# Patient Record
Sex: MALE | Race: BLACK OR AFRICAN AMERICAN | Employment: UNEMPLOYED | URBAN - METROPOLITAN AREA
[De-identification: names, ages, dates, MRNs, and addresses within clinical notes are randomized per-mention and may not be internally consistent; named-entity substitution may affect disease eponyms.]

---

## 2017-10-02 ENCOUNTER — TELEPHONE (OUTPATIENT)
Dept: INTERNAL MEDICINE | Facility: CLINIC | Age: 37
End: 2017-10-02

## 2018-01-02 ENCOUNTER — TELEPHONE (OUTPATIENT)
Dept: FAMILY MEDICINE | Facility: CLINIC | Age: 38
End: 2018-01-02

## 2018-01-02 NOTE — TELEPHONE ENCOUNTER
Reason for Call:  Form, our goal is to have forms completed with 72 hours, however, some forms may require a visit or additional information.    Type of letter, form or note:  Unemployment form    Who is the form from?: Patient    Where did the form come from: Patient or family brought in       What clinic location was the form placed at?: [facility])    Where the form was placed: 's Box    What number is listed as a contact on the form?: [phone]       Additional comments: Please call patient when the form is ready to be picked up at the .    Call taken on 1/2/2018 at 12:24 PM by Yaneli Quiroz

## 2018-01-02 NOTE — TELEPHONE ENCOUNTER
Date forms received: 01/02/2017  Form completed as much as possible by Ashley Woodward.  Forms placed: in providers folder Date placed: 01/02/2018  Ashley Woodward

## 2018-01-03 NOTE — TELEPHONE ENCOUNTER
Last seen by me in 09/2015. Unable to complete forms w/o seeing pt.     Leyla Chatman MD.   Family Physician.  [facility].

## 2018-01-05 ENCOUNTER — OFFICE VISIT (OUTPATIENT)
Dept: FAMILY MEDICINE | Facility: CLINIC | Age: 38
End: 2018-01-05
Payer: COMMERCIAL

## 2018-01-05 VITALS
DIASTOLIC BLOOD PRESSURE: 79 MMHG | BODY MASS INDEX: 24.17 KG/M2 | HEIGHT: 67 IN | WEIGHT: 154 LBS | SYSTOLIC BLOOD PRESSURE: 131 MMHG | TEMPERATURE: 97.7 F | HEART RATE: 79 BPM

## 2018-01-05 DIAGNOSIS — M25.561 CHRONIC PAIN OF RIGHT KNEE: Primary | ICD-10-CM

## 2018-01-05 DIAGNOSIS — G89.29 CHRONIC PAIN OF RIGHT KNEE: Primary | ICD-10-CM

## 2018-01-05 PROCEDURE — 99214 OFFICE O/P EST MOD 30 MIN: CPT | Performed by: FAMILY MEDICINE

## 2018-01-05 RX ORDER — IBUPROFEN 600 MG/1
600 TABLET, FILM COATED ORAL EVERY 6 HOURS PRN
Qty: 90 TABLET | Refills: 0 | Status: SHIPPED | OUTPATIENT
Start: 2018-01-05

## 2018-01-05 NOTE — NURSING NOTE
"Chief Complaint   Patient presents with     Forms     unemployment papers        Initial /79  Pulse 79  Temp 97.7  F (36.5  C) (Oral)  Ht 5' 6.5\" (1.689 m)  Wt 154 lb (69.9 kg)  BMI 24.48 kg/m2 Estimated body mass index is 24.48 kg/(m^2) as calculated from the following:    Height as of this encounter: 5' 6.5\" (1.689 m).    Weight as of this encounter: 154 lb (69.9 kg).  Medication Reconciliation: complete  Rakel Leonardo MA    "

## 2018-01-05 NOTE — MR AVS SNAPSHOT
After Visit Summary   1/5/2018    Ysabel Diggs    MRN: [id number]           Patient Information     Date Of Birth          1980        Visit Information        Provider Department      1/5/2018 7:40 AM Leyla Chatman MD [facility]        Today's Diagnoses     Chronic pain of right knee    -  1       Follow-ups after your visit        Additional Services     [facility] PT, HAND, AND CHIROPRACTIC REFERRAL       **This order will print in the [facility] Scheduling Office**    Physical Therapy, Hand Therapy and Chiropractic Care are available through:    *[facility]  *[facility]  *[facility]    Call one number to schedule at any of the above locations: [phone].    Your provider has referred you to: Physical Therapy at [facility] or [facility]    Indication/Reason for Referral: right knee pain.   Onset of Illness: 2 yrs ago.   Therapy Orders: Evaluate and Treat  Special Programs:   Special Request:     Lexie Owen      Additional Comments for the Therapist or Chiropractor:     Please be aware that coverage of these services is subject to the terms and limitations of your health insurance plan.  Call member services at your health plan with any benefit or coverage questions.      Please bring the following to your appointment:    *Your personal calendar for scheduling future appointments  *Comfortable clothing                  Future tests that were ordered for you today     Open Future Orders        Priority Expected Expires Ordered    MR Knee Right w/o Contrast Routine  1/5/2019 1/5/2018            Who to contact     If you have questions or need follow up information about today's clinic visit or your schedule please contact [facility] directly at [phone].  Normal or non-critical lab and imaging results will be communicated to you by MyChart, letter or phone within 4 business days after the clinic  "has received the results. If you do not hear from us within 7 days, please contact the clinic through [employer] or phone. If you have a critical or abnormal lab result, we will notify you by phone as soon as possible.  Submit refill requests through [employer] or call your pharmacy and they will forward the refill request to us. Please allow 3 business days for your refill to be completed.          Additional Information About Your Visit        [employer] Information     [employer] lets you send messages to your doctor, view your test results, renew your prescriptions, schedule appointments and more. To sign up, go to [email or web address] . Click on \"Log in\" on the left side of the screen, which will take you to the Welcome page. Then click on \"Sign up Now\" on the right side of the page.     You will be asked to enter the access code listed below, as well as some personal information. Please follow the directions to create your username and password.     Your access code is: [email or web address]  Expires: 2018  8:33 AM     Your access code will  in 90 days. If you need help or a new code, please call your [place] clinic or [phone].        Care EveryWhere ID     This is your Care EveryWhere ID. This could be used by other organizations to access your [place] medical records  [id number]        Your Vitals Were     Pulse Temperature Height BMI (Body Mass Index)          79 97.7  F (36.5  C) (Oral) 5' 6.5\" (1.689 m) 24.48 kg/m2         Blood Pressure from Last 3 Encounters:   18 131/79   09/02/15 110/69   11 108/72    Weight from Last 3 Encounters:   18 154 lb (69.9 kg)   09/02/15 145 lb (65.8 kg)   11 166 lb (75.3 kg)              We Performed the Following     [facility] PT, HAND, AND CHIROPRACTIC REFERRAL          Today's Medication Changes          These changes are accurate as of: 18  8:33 AM.  If you have any questions, ask your nurse or doctor.               Start taking these medicines.  "       Dose/Directions    ibuprofen 600 MG tablet   Commonly known as:  ADVIL/MOTRIN   Used for:  Chronic pain of right knee   Started by:  Leyla Chatman MD        Dose:  600 mg   Take 1 tablet (600 mg) by mouth every 6 hours as needed for moderate pain   Quantity:  90 tablet   Refills:  0            Where to get your medicines      These medications were sent to [facility]/pharmacy #[id number] - [place], [place] - [address] [facility] [address]     Phone:  [phone]     ibuprofen 600 MG tablet                Primary Care Provider Office Phone # Fax #    Noemí Mendoza [phone]       [address]  [facility] [address]        Equal Access to Services     [facility] : Amy ricoo Soalena, waaxda luqadaha, qaybta kaalmada adelorie, bianca salmon . So [facility] [phone].    ATENCIÓN: Si habla español, tiene a schafer disposición servicios gratuitos de asistencia lingüística. [facility] [phone].    We comply with applicable federal civil rights laws and [place] laws. We do not discriminate on the basis of race, color, national origin, age, disability, sex, sexual orientation, or gender identity.            Thank you!     Thank you for choosing [facility]  for your care. Our goal is always to provide you with excellent care. Hearing back from our patients is one way we can continue to improve our services. Please take a few minutes to complete the written survey that you may receive in the mail after your visit with us. Thank you!             Your Updated Medication List - Protect others around you: Learn how to safely use, store and throw away your medicines at www.disposemymeds.org.          This list is accurate as of: 1/5/18  8:33 AM.  Always use your most recent med list.                   Brand Name Dispense Instructions for use Diagnosis    ibuprofen 600 MG tablet     ADVIL/MOTRIN    90 tablet    Take 1 tablet (600 mg) by mouth every 6 hours as needed for moderate pain    Chronic pain of right knee

## 2018-01-05 NOTE — PROGRESS NOTES
SUBJECTIVE:   Ysabel Diggs is a 37 year old male who presents to clinic today for the following health issues:    Unemployment papers.     He has been working as  for last 19 yrs.   He works 40 hrs /week.   Job responsibilities: varies. 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   He has right knee pain and pain gets worse with walking for more than 1/2 hr at a time. As his knee pain flares up he is requesting his employer for the job that     Right knee pain x last 2 yrs. He plays soccer and then had knee sprain. Per pt he had MRI done that showed torn ligament and had some supportive treatments done. Was using knee brace. It helped with pain. He was advised to get knee surgery done however due to lack of insurance coverage he could not get the surgery done.     He is requesting form to be filled out for work restriction: not to walk for more than 1/2 hr at a time as his knee swells up, gets painful and he cannot walk.     Problem list and histories reviewed & adjusted, as indicated.  Additional history: as documented    Patient Active Problem List   Diagnosis     H. pylori infection     Smoking     CARDIOVASCULAR SCREENING; LDL GOAL LESS THAN 160     Past Surgical History:   Procedure Laterality Date     C APPENDECTOMY  2005     UPPER GI ENDOSCOPY  June 2010    Esophagitis + stomach ulcer       Social History   Substance Use Topics     Smoking status: Current Some Day Smoker     Packs/day: 0.50     Types: Cigarettes     Smokeless tobacco: Never Used      Comment: 10-15 cig a day     Alcohol use Yes      Comment: [place].     Family History   Problem Relation Age of Onset     Hypertension Mother      DIABETES Father          No current outpatient prescriptions on file.     No Known Allergies  Recent Labs   Lab Test  09/02/15   1206  03/11/10   0919   A1C  5.5   --    LDL  118   --    HDL  49   --    TRIG  222*   --    ALT   --   25   CR   --   0.90  "  GFRESTIMATED   --   >90   GFRESTBLACK   --   >90   POTASSIUM   --   3.8      BP Readings from Last 3 Encounters:   01/05/18 131/79   09/02/15 110/69   06/23/11 108/72    Wt Readings from Last 3 Encounters:   01/05/18 154 lb (69.9 kg)   09/02/15 145 lb (65.8 kg)   06/23/11 166 lb (75.3 kg)                  Labs reviewed in EPIC          Reviewed and updated as needed this visit by clinical staffTobacco  Allergies  Meds  Med Hx  Surg Hx  Fam Hx  Soc Hx      Reviewed and updated as needed this visit by Provider         ROS:  Constitutional, HEENT, cardiovascular, pulmonary, gi and gu systems are negative, except as otherwise noted.      OBJECTIVE:   /79  Pulse 79  Temp 97.7  F (36.5  C) (Oral)  Ht 5' 6.5\" (1.689 m)  Wt 154 lb (69.9 kg)  BMI 24.48 kg/m2  Body mass index is 24.48 kg/(m^2).  GENERAL: healthy, alert and no distress  Right knee : no swelling, erythema or warmth.   No specific point tenderness.   Knee flexion is full with crepitus felt with knee flexion.   Knee extension is normal.   Tests for menisci: mild discomfort   Normal right hip and ankle.     ASSESSMENT/PLAN:       ICD-10-CM    1. Chronic pain of right knee M25.561 MR Knee Right w/o Contrast    G89.29 [facility] PT, HAND, AND CHIROPRACTIC REFERRAL     ibuprofen (ADVIL/MOTRIN) 600 MG tablet     Chronic right knee pain, was seen 2 yrs ago, no records in EPIC.   As per his knee exam, high chances of improving with PT. However as surgery was advised in past and as confirmed diagnosis is needed for workability, knee MRI is ordered.   Work restriction for 3 months: meanwhile PT, Ibuprofen for pain PRN, ice pack PRN for knee swelling, wear knee brace, MRI right knee, if needed will refer to Ortho.     Total visit time 25 mins, more than 505 time was spent in face to face counseling.     Leyla Chatman MD  [facility]  "

## 2018-01-12 ENCOUNTER — OFFICE VISIT (OUTPATIENT)
Dept: FAMILY MEDICINE | Facility: CLINIC | Age: 38
End: 2018-01-12

## 2018-01-12 VITALS
SYSTOLIC BLOOD PRESSURE: 125 MMHG | HEIGHT: 67 IN | BODY MASS INDEX: 24.33 KG/M2 | HEART RATE: 84 BPM | DIASTOLIC BLOOD PRESSURE: 85 MMHG | OXYGEN SATURATION: 99 % | WEIGHT: 155 LBS | TEMPERATURE: 97.6 F

## 2018-01-12 DIAGNOSIS — Z59.89 ON ECONOMIC ASSISTANCE PROGRAM: Primary | ICD-10-CM

## 2018-01-12 SDOH — ECONOMIC STABILITY - INCOME SECURITY: OTHER PROBLEMS RELATED TO HOUSING AND ECONOMIC CIRCUMSTANCES: Z59.89

## 2018-01-12 NOTE — MR AVS SNAPSHOT
"              After Visit Summary   1/12/2018    Ysabel Diggs    MRN: [id number]           Patient Information     Date Of Birth          1980        Visit Information        Provider Department      1/12/2018 1:00 PM Nesha Mcneil MD [facility]        Today's Diagnoses     On economic assistance program    -  1       Follow-ups after your visit        Who to contact     If you have questions or need follow up information about today's clinic visit or your schedule please contact [facility] directly at [phone].  Normal or non-critical lab and imaging results will be communicated to you by [employer], letter or phone within 4 business days after the clinic has received the results. If you do not hear from us within 7 days, please contact the clinic through [employer] or phone. If you have a critical or abnormal lab result, we will notify you by phone as soon as possible.  Submit refill requests through [employer] or call your pharmacy and they will forward the refill request to us. Please allow 3 business days for your refill to be completed.          Additional Information About Your Visit        MyChart Information     [employer] gives you secure access to your electronic health record. If you see a primary care provider, you can also send messages to your care team and make appointments. If you have questions, please call your primary care clinic.  If you do not have a primary care provider, please call [phone] and they will assist you.        Care EveryWhere ID     This is your Care EveryWhere ID. This could be used by other organizations to access your [place] medical records  [id number]        Your Vitals Were     Pulse Temperature Height Pulse Oximetry BMI (Body Mass Index)       84 97.6  F (36.4  C) 5' 6.5\" (1.689 m) 99% 24.64 kg/m2        Blood Pressure from Last 3 Encounters:   01/12/18 125/85   01/05/18 131/79   09/02/15 110/69    Weight from Last 3 Encounters: "   01/12/18 155 lb (70.3 kg)   01/05/18 154 lb (69.9 kg)   09/02/15 145 lb (65.8 kg)              Today, you had the following     No orders found for display       Primary Care Provider Office Phone # Fax #    Noemí Mendoza [phone]       [address] [facility] [address]        Equal Access to Services     VALDEMAR LOVE : Hadii aad ku hadasho Soomaali, waaxda luqadaha, qaybta kaalmada adeegyada, waxay idiin hayaan adeeg kharash la'yovana . So [facility] [phone].    ATENCIÓN: Si habla sam, tiene a schafer disposición servicios gratuitos de asistencia lingüística. Llame al [phone].    We comply with applicable federal civil rights laws and [place] laws. We do not discriminate on the basis of race, color, national origin, age, disability, sex, sexual orientation, or gender identity.            Thank you!     Thank you for choosing [facility]  for your care. Our goal is always to provide you with excellent care. Hearing back from our patients is one way we can continue to improve our services. Please take a few minutes to complete the written survey that you may receive in the mail after your visit with us. Thank you!             Your Updated Medication List - Protect others around you: Learn how to safely use, store and throw away your medicines at www.disposemymeds.org.          This list is accurate as of: 1/12/18  1:53 PM.  Always use your most recent med list.                   Brand Name Dispense Instructions for use Diagnosis    ibuprofen 600 MG tablet    ADVIL/MOTRIN    90 tablet    Take 1 tablet (600 mg) by mouth every 6 hours as needed for moderate pain    Chronic pain of right knee

## 2018-01-12 NOTE — PROGRESS NOTES
SUBJECTIVE:   Ysabel Diggs is a 37 year old male who presents to clinic today for the following health issues:      Unemployment Disability paperwork    Patient presents to clinic and requests that I fill out his unemployment disability paperwork. Patient has already been to a different [place] clinic on 1/5/2018 for the same request. I politely advised the patient that I was not able to help with his unemployment disability paperwork and that he should continue the current evaluation for disability paperwork from the recent 1/5/18 clinic appointment. The patient became angry and subsequently walked out of the exam room.      There will be no charge for today's clinic visit.

## 2018-01-15 ENCOUNTER — OFFICE VISIT (OUTPATIENT)
Dept: FAMILY MEDICINE | Facility: CLINIC | Age: 38
End: 2018-01-15
Payer: COMMERCIAL

## 2018-01-15 VITALS
HEART RATE: 82 BPM | SYSTOLIC BLOOD PRESSURE: 125 MMHG | DIASTOLIC BLOOD PRESSURE: 83 MMHG | BODY MASS INDEX: 24.8 KG/M2 | TEMPERATURE: 98.5 F | WEIGHT: 156 LBS

## 2018-01-15 DIAGNOSIS — M25.561 CHRONIC PAIN OF BOTH KNEES: Primary | ICD-10-CM

## 2018-01-15 DIAGNOSIS — M25.562 CHRONIC PAIN OF BOTH KNEES: Primary | ICD-10-CM

## 2018-01-15 DIAGNOSIS — G89.29 CHRONIC PAIN OF BOTH KNEES: Primary | ICD-10-CM

## 2018-01-15 PROCEDURE — 99207 ZZC NO CHARGE LOS: CPT | Performed by: FAMILY MEDICINE

## 2018-01-15 NOTE — MR AVS SNAPSHOT
After Visit Summary   1/15/2018    Ysabel Diggs    MRN: [id number]           Patient Information     Date Of Birth          1980        Visit Information        Provider Department      1/15/2018 6:00 PM Leyla Chatman MD [facility]        Today's Diagnoses     Chronic pain of both knees    -  1       Follow-ups after your visit        Who to contact     If you have questions or need follow up information about today's clinic visit or your schedule please contact [facility] directly at [phone].  Normal or non-critical lab and imaging results will be communicated to you by spigithart, letter or phone within 4 business days after the clinic has received the results. If you do not hear from us within 7 days, please contact the clinic through [employer] or phone. If you have a critical or abnormal lab result, we will notify you by phone as soon as possible.  Submit refill requests through [employer] or call your pharmacy and they will forward the refill request to us. Please allow 3 business days for your refill to be completed.          Additional Information About Your Visit        MyChart Information     [employer] gives you secure access to your electronic health record. If you see a primary care provider, you can also send messages to your care team and make appointments. If you have questions, please call your primary care clinic.  If you do not have a primary care provider, please call [phone] and they will assist you.        Care EveryWhere ID     This is your Care EveryWhere ID. This could be used by other organizations to access your [place] medical records  [id number]        Your Vitals Were     Pulse Temperature BMI (Body Mass Index)             82 98.5  F (36.9  C) (Oral) 24.8 kg/m2          Blood Pressure from Last 3 Encounters:   01/15/18 125/83   01/12/18 125/85   01/05/18 131/79    Weight from Last 3 Encounters:   01/15/18  156 lb (70.8 kg)   01/12/18 155 lb (70.3 kg)   01/05/18 154 lb (69.9 kg)              Today, you had the following     No orders found for display       Primary Care Provider Office Phone # Fax #    Noemí Mendoza [phone]       [address] [facility] [address]        Equal Access to Services     [facility]: Hadii aad ku hadasho Soomaali, waaxda luqadaha, qaybta kaalmada adeegyada, waxay idiin hayaan adeeg kharash la'aan . So [facility] [phone].    ATENCIÓN: Si brian vargas, tiene a schafer disposición servicios gratuitos de asistencia lingüística. Llame al [phone].    We comply with applicable federal civil rights laws and [place] laws. We do not discriminate on the basis of race, color, national origin, age, disability, sex, sexual orientation, or gender identity.            Thank you!     Thank you for choosing [facility]  for your care. Our goal is always to provide you with excellent care. Hearing back from our patients is one way we can continue to improve our services. Please take a few minutes to complete the written survey that you may receive in the mail after your visit with us. Thank you!             Your Updated Medication List - Protect others around you: Learn how to safely use, store and throw away your medicines at www.disposemymeds.org.          This list is accurate as of: 1/15/18 11:59 PM.  Always use your most recent med list.                   Brand Name Dispense Instructions for use Diagnosis    ibuprofen 600 MG tablet    ADVIL/MOTRIN    90 tablet    Take 1 tablet (600 mg) by mouth every 6 hours as needed for moderate pain    Chronic pain of right knee

## 2018-01-16 NOTE — NURSING NOTE
"Chief Complaint   Patient presents with     Forms       Initial /83  Pulse 82  Temp 98.5  F (36.9  C) (Oral)  Wt 156 lb (70.8 kg)  BMI 24.8 kg/m2 Estimated body mass index is 24.8 kg/(m^2) as calculated from the following:    Height as of 1/12/18: 5' 6.5\" (1.689 m).    Weight as of this encounter: 156 lb (70.8 kg).  Medication Reconciliation: complete  Rakel Leonardo MA    "

## 2018-01-16 NOTE — PROGRESS NOTES
SUBJECTIVE:   Ysabel Diggs is a 37 year old male who presents to clinic today for the following health issues:  Unemployment form :     Pt was seen by me approx 2 weeks ago. Forms were filled out for workability.     Today he is here requesting to change the dates on his forms. Says that he could not work due to his knee pain since Oct 2017, his employer did not pay him. He wanted me to fill out the forms from Oct so that he may get paid for that time frame.   Explained that I am unable to fill out the forms and change dates from Oct 2017 as he was not seen by me.   He was told several times during last office visit that we do not have any records of his knee MRI/ knee exams etc. He was upset with that and started arguing. I gave him JUAN form to get records, offered to talk to clinic manager. He did not agree with anything and left the clinic.     Leyla Chatman MD.   Family Physician.  [facility].

## 2018-04-26 ENCOUNTER — OFFICE VISIT (OUTPATIENT)
Dept: FAMILY MEDICINE | Facility: CLINIC | Age: 38
End: 2018-04-26
Payer: MEDICAID

## 2018-04-26 VITALS
BODY MASS INDEX: 24.64 KG/M2 | DIASTOLIC BLOOD PRESSURE: 77 MMHG | SYSTOLIC BLOOD PRESSURE: 116 MMHG | WEIGHT: 155 LBS | TEMPERATURE: 97.6 F | HEART RATE: 79 BPM | OXYGEN SATURATION: 99 %

## 2018-04-26 DIAGNOSIS — Z13.220 SCREENING FOR CHOLESTEROL LEVEL: ICD-10-CM

## 2018-04-26 DIAGNOSIS — R68.82 DECREASED LIBIDO: Primary | ICD-10-CM

## 2018-04-26 DIAGNOSIS — R30.0 DYSURIA: ICD-10-CM

## 2018-04-26 DIAGNOSIS — Z83.3 FAMILY HISTORY OF DIABETES MELLITUS: ICD-10-CM

## 2018-04-26 LAB
ALBUMIN UR-MCNC: NEGATIVE MG/DL
APPEARANCE UR: CLEAR
BILIRUB UR QL STRIP: NEGATIVE
CHOLEST SERPL-MCNC: 244 MG/DL
COLOR UR AUTO: YELLOW
GLUCOSE BLD-MCNC: 79 MG/DL (ref 70–99)
GLUCOSE UR STRIP-MCNC: NEGATIVE MG/DL
HDLC SERPL-MCNC: 53 MG/DL
HGB UR QL STRIP: NEGATIVE
KETONES UR STRIP-MCNC: NEGATIVE MG/DL
LDLC SERPL CALC-MCNC: 145 MG/DL
LEUKOCYTE ESTERASE UR QL STRIP: NEGATIVE
NITRATE UR QL: NEGATIVE
NONHDLC SERPL-MCNC: 191 MG/DL
PH UR STRIP: 6.5 PH (ref 5–7)
SOURCE: NORMAL
SP GR UR STRIP: 1.01 (ref 1–1.03)
TRIGL SERPL-MCNC: 228 MG/DL
UROBILINOGEN UR STRIP-ACNC: 0.2 EU/DL (ref 0.2–1)

## 2018-04-26 PROCEDURE — 87491 CHLMYD TRACH DNA AMP PROBE: CPT | Performed by: FAMILY MEDICINE

## 2018-04-26 PROCEDURE — 81003 URINALYSIS AUTO W/O SCOPE: CPT | Performed by: FAMILY MEDICINE

## 2018-04-26 PROCEDURE — 36415 COLL VENOUS BLD VENIPUNCTURE: CPT | Performed by: FAMILY MEDICINE

## 2018-04-26 PROCEDURE — 99213 OFFICE O/P EST LOW 20 MIN: CPT | Performed by: FAMILY MEDICINE

## 2018-04-26 PROCEDURE — 87591 N.GONORRHOEAE DNA AMP PROB: CPT | Performed by: FAMILY MEDICINE

## 2018-04-26 PROCEDURE — 82947 ASSAY GLUCOSE BLOOD QUANT: CPT | Performed by: FAMILY MEDICINE

## 2018-04-26 PROCEDURE — 80061 LIPID PANEL: CPT | Performed by: FAMILY MEDICINE

## 2018-04-26 PROCEDURE — 84403 ASSAY OF TOTAL TESTOSTERONE: CPT | Performed by: FAMILY MEDICINE

## 2018-04-26 NOTE — LETTER
[facility]   [address] [place]  [address]  [phone]                                   May 1, 2018    Ysabel Diggs  [address]   [place] [address]        Dear Ysabel,    Your test for chlamydia and gonorrhea were normal   Your testosterone level is normal   Your blood sugar is normal   Your urine analysis is normal     Your cholesterols are elevated and are higher thatn the last time   They are at the level that you need to change your diet to low fat and need to increase your exercise   Follow up with fasting labs in 6 months.  If no change you will need to start on cholesterol lowering medications    Results for orders placed or performed in visit on 04/26/18   UA reflex to Microscopic and Culture   Result Value Ref Range    Color Urine Yellow     Appearance Urine Clear     Glucose Urine Negative NEG^Negative mg/dL    Bilirubin Urine Negative NEG^Negative    Ketones Urine Negative NEG^Negative mg/dL    Specific Gravity Urine 1.010 1.003 - 1.035    Blood Urine Negative NEG^Negative    pH Urine 6.5 5.0 - 7.0 pH    Protein Albumin Urine Negative NEG^Negative mg/dL    Urobilinogen Urine 0.2 0.2 - 1.0 EU/dL    Nitrite Urine Negative NEG^Negative    Leukocyte Esterase Urine Negative NEG^Negative    Source Midstream Urine    Glucose whole blood   Result Value Ref Range    Glucose Whole Blood 79 70 - 99 mg/dL   Lipid panel reflex to direct LDL Fasting   Result Value Ref Range    Cholesterol 244 (H) <200 mg/dL    Triglycerides 228 (H) <150 mg/dL    HDL Cholesterol 53 >39 mg/dL    LDL Cholesterol Calculated 145 (H) <100 mg/dL    Non HDL Cholesterol 191 (H) <130 mg/dL   Testosterone, total   Result Value Ref Range    Testosterone Total 586 240 - 950 ng/dL   Chlamydia trachomatis PCR   Result Value Ref Range    Specimen Description Urine     Chlamydia Trachomatis PCR Negative NEG^Negative   Neisseria gonorrhoeae PCR   Result Value Ref Range    Specimen Descrip  Urine     N Gonorrhea PCR Negative NEG^Negative       If you have any questions please call the clinic at [phone]    Sincerely,    Ramiro Rick MD  bmd

## 2018-04-26 NOTE — PROGRESS NOTES
SUBJECTIVE:   Ysabel Diggs is a 37 year old male who presents to clinic today for the following health issues:    Genitourinary - Male  Onset: x 1 mo    Description:   Dysuria (painful urination): YES  Hematuria (blood in urine): no   Frequency: no   Are you urinating at night : YES  Hesitancy (delay in urine): YES  Retention (unable to empty): YES  Decrease in urinary flow: YES- Needs to push out  Incontinence: no     Progression of Symptoms:  worsening    Accompanying Signs & Symptoms:  Fever: no   Back/Flank pain: YES  Urethral discharge: YES- Few times  Testicle lumps/masses/pain: No   Nausea and/or vomiting: YES  Abdominal pain: YES    History:   History of frequent UTI's: YES  History of kidney stones: no   History of hernias: no   Personal or Family history of Prostate problems: no  Sexually active: not currently    **Would like to get his chol and check for diabetes due to family History    No family history of cholesterol problems   No heart disease in men before age of 50      Father, brother and sister have diabetes       Problem list and histories reviewed & adjusted, as indicated.    Ros: no chest pain, chest tightness   No sob   No leg edema   abdominal pain that is chronic   Has had colonoscopy and endoscopy   Treated for H.Pylori a few years ago     Patient has chronic constipation   He is not using fiber supplements     Denies fever or chills   Weight stable       O: /77 (BP Location: Left arm, Patient Position: Sitting, Cuff Size: Adult Regular)  Pulse 79  Temp 97.6  F (36.4  C) (Oral)  Wt 155 lb (70.3 kg)  SpO2 99%  BMI 24.64 kg/m2    The abdomen is soft without tenderness, guarding, mass, rebound or organomegaly. Bowel sounds are normal. No CVA tenderness or inguinal adenopathy noted.    Patient has smaller testicles   They are non tender     Genitalia otherwise normal     No discharge noted     Here with his fiancee       ICD-10-CM    1. Decreased libido R68.82 Glucose whole  blood     Testosterone, total   2. Dysuria R30.0 UA reflex to Microscopic and Culture     Chlamydia trachomatis PCR     Neisseria gonorrhoeae PCR   3. Family history of diabetes mellitus Z83.3 Glucose whole blood   4. Screening for cholesterol level Z13.220 Lipid panel reflex to direct LDL Fasting

## 2018-04-26 NOTE — NURSING NOTE
"Chief Complaint   Patient presents with     UTI     Sx's     Blood Draw     Would like to get his chol and check for diabetes due to family Hx       Initial /77 (BP Location: Left arm, Patient Position: Sitting, Cuff Size: Adult Regular)  Pulse 79  Temp 97.6  F (36.4  C) (Oral)  Wt 155 lb (70.3 kg)  SpO2 99%  BMI 24.64 kg/m2 Estimated body mass index is 24.64 kg/(m^2) as calculated from the following:    Height as of 1/12/18: 5' 6.5\" (1.689 m).    Weight as of this encounter: 155 lb (70.3 kg).  Medication Reconciliation: complete   Adeola Morin MA      "

## 2018-04-26 NOTE — MR AVS SNAPSHOT
After Visit Summary   4/26/2018    Ysabel Diggs    MRN: [id number]           Patient Information     Date Of Birth          1980        Visit Information        Provider Department      4/26/2018 8:40 AM Ramiro Rick MD [facility]        Today's Diagnoses     Decreased libido    -  1    Dysuria        Family history of diabetes mellitus        Screening for cholesterol level           Follow-ups after your visit        Who to contact     If you have questions or need follow up information about today's clinic visit or your schedule please contact [facility] directly at [phone].  Normal or non-critical lab and imaging results will be communicated to you by MyChart, letter or phone within 4 business days after the clinic has received the results. If you do not hear from us within 7 days, please contact the clinic through [employer] or phone. If you have a critical or abnormal lab result, we will notify you by phone as soon as possible.  Submit refill requests through [employer] or call your pharmacy and they will forward the refill request to us. Please allow 3 business days for your refill to be completed.          Additional Information About Your Visit        MyChart Information     [employer] gives you secure access to your electronic health record. If you see a primary care provider, you can also send messages to your care team and make appointments. If you have questions, please call your primary care clinic.  If you do not have a primary care provider, please call [phone] and they will assist you.        Care EveryWhere ID     This is your Care EveryWhere ID. This could be used by other organizations to access your [facility] medical records  [id number]        Your Vitals Were     Pulse Temperature Pulse Oximetry BMI (Body Mass Index)          79 97.6  F (36.4  C) (Oral) 99% 24.64 kg/m2         Blood Pressure from Last 3 Encounters:    04/26/18 116/77   01/15/18 125/83   01/12/18 125/85    Weight from Last 3 Encounters:   04/26/18 155 lb (70.3 kg)   01/15/18 156 lb (70.8 kg)   01/12/18 155 lb (70.3 kg)              We Performed the Following     Chlamydia trachomatis PCR     Glucose whole blood     Lipid panel reflex to direct LDL Fasting     Neisseria gonorrhoeae PCR     Testosterone, total     UA reflex to Microscopic and Culture        Primary Care Provider Office Phone # Fax #    Noemí Mendoza [phone]       [address]  [facility] [address]        Equal Access to Services     [facility] : Hadii brooks ricoo Christie, waaxda luqadaha, qaybta kaalmada hardik, bianca salmon . So [facility] [phone].    ATENCIÓN: Si habla español, tiene a schafer disposición servicios gratuitos de asistencia lingüística. [facility] [phone].    We comply with applicable federal civil rights laws and [place] laws. We do not discriminate on the basis of race, color, national origin, age, disability, sex, sexual orientation, or gender identity.            Thank you!     Thank you for choosing [facility]  for your care. Our goal is always to provide you with excellent care. Hearing back from our patients is one way we can continue to improve our services. Please take a few minutes to complete the written survey that you may receive in the mail after your visit with us. Thank you!             Your Updated Medication List - Protect others around you: Learn how to safely use, store and throw away your medicines at www.disposemymeds.org.          This list is accurate as of 4/26/18  9:19 AM.  Always use your most recent med list.                   Brand Name Dispense Instructions for use Diagnosis    ibuprofen 600 MG tablet    ADVIL/MOTRIN    90 tablet    Take 1 tablet (600 mg) by mouth every 6 hours as needed for moderate pain    Chronic pain of right knee

## 2018-04-27 LAB
C TRACH DNA SPEC QL NAA+PROBE: NEGATIVE
N GONORRHOEA DNA SPEC QL NAA+PROBE: NEGATIVE
SPECIMEN SOURCE: NORMAL
SPECIMEN SOURCE: NORMAL

## 2018-04-30 LAB — TESTOST SERPL-MCNC: 586 NG/DL (ref 240–950)

## 2018-05-01 NOTE — PROGRESS NOTES
Your test for chlamydia and gonorrhea were normal   Your testosterone level is normal   Your blood sugar is normal   Your urine analysis is normal     Your cholesterols are elevated and are higher thatn the last time   They are at the level that you need to change your diet to low fat and need to increase your exercise   Follow up with fasting labs in 6 months.  If no change you will need to start on cholesterol lowering medications

## 2018-08-16 ENCOUNTER — AMBULATORY - HEALTHEAST (OUTPATIENT)
Dept: FAMILY MEDICINE | Facility: CLINIC | Age: 38
End: 2018-08-16

## 2018-08-16 ENCOUNTER — OFFICE VISIT - HEALTHEAST (OUTPATIENT)
Dept: FAMILY MEDICINE | Facility: CLINIC | Age: 38
End: 2018-08-16

## 2018-08-16 DIAGNOSIS — Q54.9 HYPOSPADIAS: ICD-10-CM

## 2018-08-16 DIAGNOSIS — N53.12 PAIN WITH EJACULATION: ICD-10-CM

## 2018-08-16 DIAGNOSIS — N48.89 PENILE PAIN: ICD-10-CM

## 2018-08-16 DIAGNOSIS — R30.0 DYSURIA: ICD-10-CM

## 2018-08-16 DIAGNOSIS — R10.9 ABDOMINAL PAIN: ICD-10-CM

## 2018-08-16 DIAGNOSIS — R31.29 MICROSCOPIC HEMATURIA: ICD-10-CM

## 2018-08-16 LAB
ALBUMIN UR-MCNC: NEGATIVE MG/DL
APPEARANCE UR: CLEAR
BACTERIA #/AREA URNS HPF: ABNORMAL HPF
BILIRUB UR QL STRIP: NEGATIVE
COLOR UR AUTO: YELLOW
GLUCOSE UR STRIP-MCNC: NEGATIVE MG/DL
HGB UR QL STRIP: ABNORMAL
KETONES UR STRIP-MCNC: NEGATIVE MG/DL
LEUKOCYTE ESTERASE UR QL STRIP: NEGATIVE
MUCOUS THREADS #/AREA URNS LPF: ABNORMAL LPF
NITRATE UR QL: NEGATIVE
PH UR STRIP: 6 [PH] (ref 5–8)
RBC #/AREA URNS AUTO: ABNORMAL HPF
SP GR UR STRIP: 1.02 (ref 1–1.03)
SQUAMOUS #/AREA URNS AUTO: ABNORMAL LPF
UROBILINOGEN UR STRIP-ACNC: ABNORMAL
WBC #/AREA URNS AUTO: ABNORMAL HPF

## 2018-08-16 ASSESSMENT — MIFFLIN-ST. JEOR: SCORE: 1533.22

## 2018-08-17 LAB
C TRACH DNA SPEC QL PROBE+SIG AMP: NEGATIVE
N GONORRHOEA DNA SPEC QL NAA+PROBE: NEGATIVE

## 2018-08-22 ENCOUNTER — COMMUNICATION - HEALTHEAST (OUTPATIENT)
Dept: FAMILY MEDICINE | Facility: CLINIC | Age: 38
End: 2018-08-22

## 2020-02-23 ENCOUNTER — HEALTH MAINTENANCE LETTER (OUTPATIENT)
Age: 40
End: 2020-02-23

## 2020-12-06 ENCOUNTER — HEALTH MAINTENANCE LETTER (OUTPATIENT)
Age: 40
End: 2020-12-06

## 2021-04-08 ENCOUNTER — APPOINTMENT (OUTPATIENT)
Dept: GENERAL RADIOLOGY | Facility: CLINIC | Age: 41
End: 2021-04-08
Attending: EMERGENCY MEDICINE
Payer: COMMERCIAL

## 2021-04-08 ENCOUNTER — HOSPITAL ENCOUNTER (EMERGENCY)
Facility: CLINIC | Age: 41
Discharge: HOME | End: 2021-04-09
Attending: EMERGENCY MEDICINE | Admitting: EMERGENCY MEDICINE
Payer: COMMERCIAL

## 2021-04-08 DIAGNOSIS — K21.00 GASTROESOPHAGEAL REFLUX DISEASE WITH ESOPHAGITIS WITHOUT HEMORRHAGE: ICD-10-CM

## 2021-04-08 LAB
ALBUMIN SERPL-MCNC: 4.1 G/DL (ref 3.4–5)
ALP SERPL-CCNC: 100 U/L (ref 40–150)
ALT SERPL W P-5'-P-CCNC: 75 U/L (ref 0–70)
ANION GAP SERPL CALCULATED.3IONS-SCNC: 9 MMOL/L (ref 3–14)
AST SERPL W P-5'-P-CCNC: 47 U/L (ref 0–45)
BASOPHILS # BLD AUTO: 0 10E9/L (ref 0–0.2)
BASOPHILS NFR BLD AUTO: 0.5 %
BILIRUB SERPL-MCNC: 0.4 MG/DL (ref 0.2–1.3)
BUN SERPL-MCNC: 9 MG/DL (ref 7–30)
CALCIUM SERPL-MCNC: 9.6 MG/DL (ref 8.5–10.1)
CHLORIDE SERPL-SCNC: 104 MMOL/L (ref 94–109)
CO2 SERPL-SCNC: 24 MMOL/L (ref 20–32)
CREAT SERPL-MCNC: 0.72 MG/DL (ref 0.66–1.25)
DIFFERENTIAL METHOD BLD: NORMAL
EOSINOPHIL # BLD AUTO: 0 10E9/L (ref 0–0.7)
EOSINOPHIL NFR BLD AUTO: 0.3 %
ERYTHROCYTE [DISTWIDTH] IN BLOOD BY AUTOMATED COUNT: 11.5 % (ref 10–15)
GFR SERPL CREATININE-BSD FRML MDRD: >90 ML/MIN/{1.73_M2}
GLUCOSE SERPL-MCNC: 127 MG/DL (ref 70–99)
HCT VFR BLD AUTO: 43.3 % (ref 40–53)
HGB BLD-MCNC: 15.2 G/DL (ref 13.3–17.7)
IMM GRANULOCYTES # BLD: 0 10E9/L (ref 0–0.4)
IMM GRANULOCYTES NFR BLD: 0.3 %
LIPASE SERPL-CCNC: 113 U/L (ref 73–393)
LYMPHOCYTES # BLD AUTO: 1.8 10E9/L (ref 0.8–5.3)
LYMPHOCYTES NFR BLD AUTO: 30.3 %
MCH RBC QN AUTO: 32.3 PG (ref 26.5–33)
MCHC RBC AUTO-ENTMCNC: 35.1 G/DL (ref 31.5–36.5)
MCV RBC AUTO: 92 FL (ref 78–100)
MONOCYTES # BLD AUTO: 0.4 10E9/L (ref 0–1.3)
MONOCYTES NFR BLD AUTO: 7.1 %
NEUTROPHILS # BLD AUTO: 3.5 10E9/L (ref 1.6–8.3)
NEUTROPHILS NFR BLD AUTO: 61.5 %
NRBC # BLD AUTO: 0 10*3/UL
NRBC BLD AUTO-RTO: 0 /100
PLATELET # BLD AUTO: 250 10E9/L (ref 150–450)
POTASSIUM SERPL-SCNC: 3.6 MMOL/L (ref 3.4–5.3)
PROT SERPL-MCNC: 8 G/DL (ref 6.8–8.8)
RBC # BLD AUTO: 4.71 10E12/L (ref 4.4–5.9)
SODIUM SERPL-SCNC: 137 MMOL/L (ref 133–144)
TROPONIN I SERPL-MCNC: <0.015 UG/L (ref 0–0.04)
WBC # BLD AUTO: 5.8 10E9/L (ref 4–11)

## 2021-04-08 PROCEDURE — 71045 X-RAY EXAM CHEST 1 VIEW: CPT | Mod: 26 | Performed by: RADIOLOGY

## 2021-04-08 PROCEDURE — 71045 X-RAY EXAM CHEST 1 VIEW: CPT

## 2021-04-08 PROCEDURE — 84484 ASSAY OF TROPONIN QUANT: CPT | Performed by: EMERGENCY MEDICINE

## 2021-04-08 PROCEDURE — 83690 ASSAY OF LIPASE: CPT | Performed by: EMERGENCY MEDICINE

## 2021-04-08 PROCEDURE — 99285 EMERGENCY DEPT VISIT HI MDM: CPT | Mod: 25

## 2021-04-08 PROCEDURE — 99285 EMERGENCY DEPT VISIT HI MDM: CPT | Mod: 25 | Performed by: EMERGENCY MEDICINE

## 2021-04-08 PROCEDURE — 85025 COMPLETE CBC W/AUTO DIFF WBC: CPT | Performed by: EMERGENCY MEDICINE

## 2021-04-08 PROCEDURE — 250N000009 HC RX 250: Performed by: EMERGENCY MEDICINE

## 2021-04-08 PROCEDURE — 93010 ELECTROCARDIOGRAM REPORT: CPT | Performed by: EMERGENCY MEDICINE

## 2021-04-08 PROCEDURE — 80053 COMPREHEN METABOLIC PANEL: CPT | Performed by: EMERGENCY MEDICINE

## 2021-04-08 PROCEDURE — 250N000013 HC RX MED GY IP 250 OP 250 PS 637: Performed by: EMERGENCY MEDICINE

## 2021-04-08 PROCEDURE — 93005 ELECTROCARDIOGRAM TRACING: CPT

## 2021-04-08 RX ADMIN — LIDOCAINE HYDROCHLORIDE 30 ML: 20 SOLUTION ORAL; TOPICAL at 22:52

## 2021-04-08 ASSESSMENT — ENCOUNTER SYMPTOMS
ABDOMINAL PAIN: 1
BACK PAIN: 0
ABDOMINAL DISTENTION: 0
ARTHRALGIAS: 0
CHILLS: 0
NERVOUS/ANXIOUS: 1
PALPITATIONS: 0
FEVER: 0
WEAKNESS: 0
CONFUSION: 0
VOMITING: 0
COLOR CHANGE: 0
NECK PAIN: 0
HEADACHES: 0
COUGH: 0
DIZZINESS: 1
DYSURIA: 0
NAUSEA: 0
CHEST TIGHTNESS: 0
EYE PAIN: 0
SORE THROAT: 0
DIFFICULTY URINATING: 0
NUMBNESS: 1
FREQUENCY: 0
SHORTNESS OF BREATH: 1
DIAPHORESIS: 1
DIARRHEA: 0
CONSTIPATION: 0
MYALGIAS: 0
FATIGUE: 0

## 2021-04-08 ASSESSMENT — MIFFLIN-ST. JEOR: SCORE: 1582.58

## 2021-04-09 VITALS
OXYGEN SATURATION: 98 % | HEIGHT: 68 IN | BODY MASS INDEX: 23.49 KG/M2 | SYSTOLIC BLOOD PRESSURE: 130 MMHG | WEIGHT: 155 LBS | TEMPERATURE: 99.2 F | HEART RATE: 103 BPM | RESPIRATION RATE: 20 BRPM | DIASTOLIC BLOOD PRESSURE: 96 MMHG

## 2021-04-09 LAB — INTERPRETATION ECG - MUSE: NORMAL

## 2021-04-09 RX ORDER — SUCRALFATE 1 G/1
1 TABLET ORAL 4 TIMES DAILY
Qty: 28 TABLET | Refills: 0 | Status: SHIPPED | OUTPATIENT
Start: 2021-04-09 | End: 2021-08-20

## 2021-04-09 NOTE — ED NOTES
Bed: ED15  Expected date:   Expected time:   Means of arrival:   Comments:  H431 40M Chest discomfort. Triage yellow. ETA 2133

## 2021-04-09 NOTE — DISCHARGE INSTRUCTIONS
Please make an appointment to follow [facility] with [facility] (phone: [phone]) as soon as possible to establish with a primary care physician.

## 2021-04-09 NOTE — ED NOTES
Pt reports the pain started about 1 hour after the meal and originated in his abdomen. Pt describes the pain as burning and radiating to his throat. Pt also reports feeling palpitations with this pain earlier.

## 2021-04-09 NOTE — ED PROVIDER NOTES
[place] EMERGENCY DEPARTMENT ([facility])  4/08/21    History     Chief Complaint   Patient presents with     Chest Pain     The history is provided by the patient and medical records.     Ysabel Diggs is a 40 year old male with a history of H pylori who presents to the Emergency Department with chest pain and shortness of breath. Patient reports he developed shortness of breath, chest discomfort, and palpitations about an hour ago after eating dinner. He also notes sweating and dizziness at onset of symptoms. He states he is now feeling nervous, shaky, and still has chest comfort that radiates into his throat and abdomen. His shortness of breath is not resolved. He has not had anything like this before. Patient reports he has had increased stress as his mother passed away and his brother was in the hospital for a month. He endorses tobacco use and states he has been smoking more lately due to this stress. Patient reports a couple of days ago he vomited out of the blue. He denies recent illness. No recent travel. Patient denies history of DVT/PE. Patient also notes numbness in the toes beginning yesterday. He states it began in his left toes only but now is bilateral. This comes and goes and often occurs after standing for some time. Of note, patient reports he got the first dose of his COVID vaccine on 4/2.    Past Medical History  Past Medical History:   Diagnosis Date     Esophagitis June 2010    LA Grade C reflux esophagitis on endoscopy     Gastric ulcer, unspecified as acute or chronic, without mention of hemorrhage or perforation June 2010    on Upper endo     H. pylori infection March 2010     Plantar warts      Smoking      Past Surgical History:   Procedure Laterality Date     C APPENDECTOMY  2005     UPPER GI ENDOSCOPY  June 2010    Esophagitis + stomach ulcer     sucralfate (CARAFATE) 1 GM tablet  ibuprofen (ADVIL/MOTRIN) 600 MG tablet      No Known Allergies  Family History  Family  "History   Problem Relation Age of Onset     Hypertension Mother      Diabetes Father      Social History   Social History     Tobacco Use     Smoking status: Current Some Day Smoker     Packs/day: 0.50     Types: Cigarettes     Smokeless tobacco: Never Used     Tobacco comment: 10-15 cig a day   Substance Use Topics     Alcohol use: Yes     Comment: Ocass.     Drug use: No      Past medical history, past surgical history, medications, allergies, family history, and social history were reviewed with the patient. No additional pertinent items.       Review of Systems   Constitutional: Positive for diaphoresis. Negative for chills, fatigue and fever.   HENT: Negative for congestion and sore throat.    Eyes: Negative for pain and visual disturbance.   Respiratory: Positive for shortness of breath. Negative for cough and chest tightness.    Cardiovascular: Positive for chest pain. Negative for palpitations.   Gastrointestinal: Positive for abdominal pain. Negative for abdominal distention, constipation, diarrhea, nausea and vomiting.   Genitourinary: Negative for difficulty urinating, dysuria, frequency and urgency.   Musculoskeletal: Negative for arthralgias, back pain, myalgias and neck pain.   Skin: Negative for color change and rash.   Neurological: Positive for dizziness and numbness (bilateral toes). Negative for weakness and headaches.   Psychiatric/Behavioral: Negative for confusion. The patient is nervous/anxious.      A complete review of systems was performed with pertinent positives and negatives noted in the HPI, and all other systems negative.    Physical Exam   BP: (!) 149/99  Pulse: 100  Temp: 99.2  F (37.3  C)  Resp: 20  Height: 172.7 cm (5' 8\")  Weight: 70.3 kg (155 lb)  SpO2: 100 %  Physical Exam  Vitals signs and nursing note reviewed.   Constitutional:       General: He is not in acute distress.     Appearance: Normal appearance. He is not ill-appearing or toxic-appearing.   HENT:      Head: " Normocephalic and atraumatic.      Nose: Nose normal.      Mouth/Throat:      Mouth: Mucous membranes are moist.   Eyes:      Pupils: Pupils are equal, round, and reactive to light.   Neck:      Musculoskeletal: Normal range of motion. No neck rigidity.   Cardiovascular:      Rate and Rhythm: Normal rate.      Pulses: Normal pulses.      Heart sounds: Normal heart sounds.   Pulmonary:      Effort: Pulmonary effort is normal. No respiratory distress.      Breath sounds: Normal breath sounds.   Abdominal:      General: Abdomen is flat. There is no distension.      Comments: Mild tenderness palpation in the midepigastric area   Musculoskeletal: Normal range of motion.         General: No swelling or deformity.   Skin:     General: Skin is warm.      Capillary Refill: Capillary refill takes less than 2 seconds.   Neurological:      General: No focal deficit present.      Mental Status: He is alert and oriented to person, place, and time.      Sensory: No sensory deficit.      Motor: No weakness.   Psychiatric:         Mood and Affect: Mood normal.         ED Course   10:18 PM  The patient was seen and examined by Mckay Reyes DO in Room ED15.      Procedures         EKG Interpretation:      Interpreted by Mckay Reyes DO  Time reviewed: 2222  Symptoms at time of EKG: chest pain  Rhythm: normal sinus   Rate: 97  Axis: NORMAL  Ectopy: none  Conduction: normal  ST Segments/ T Waves: No ST-T wave changes  Q Waves: none  Comparison to prior: None available    Clinical Impression: normal EKG             Results for orders placed or performed during the hospital encounter of 04/08/21   XR Chest Port 1 View     Status: None    Narrative    EXAM: XR CHEST PORT 1 VW  LOCATION: [facility]  DATE/TIME: 4/8/2021 10:33 PM    INDICATION: Left chest pain.  COMPARISON: 10/29/2008.      Impression    IMPRESSION: Negative chest.   CBC with platelets differential     Status: None   Result Value Ref Range    WBC 5.8 4.0 -  11.0 10e9/L    RBC Count 4.71 4.4 - 5.9 10e12/L    Hemoglobin 15.2 13.3 - 17.7 g/dL    Hematocrit 43.3 40.0 - 53.0 %    MCV 92 78 - 100 fl    MCH 32.3 26.5 - 33.0 pg    MCHC 35.1 31.5 - 36.5 g/dL    RDW 11.5 10.0 - 15.0 %    Platelet Count 250 150 - 450 10e9/L    Diff Method Automated Method     % Neutrophils 61.5 %    % Lymphocytes 30.3 %    % Monocytes 7.1 %    % Eosinophils 0.3 %    % Basophils 0.5 %    % Immature Granulocytes 0.3 %    Nucleated RBCs 0 0 /100    Absolute Neutrophil 3.5 1.6 - 8.3 10e9/L    Absolute Lymphocytes 1.8 0.8 - 5.3 10e9/L    Absolute Monocytes 0.4 0.0 - 1.3 10e9/L    Absolute Eosinophils 0.0 0.0 - 0.7 10e9/L    Absolute Basophils 0.0 0.0 - 0.2 10e9/L    Abs Immature Granulocytes 0.0 0 - 0.4 10e9/L    Absolute Nucleated RBC 0.0    Comprehensive metabolic panel     Status: Abnormal   Result Value Ref Range    Sodium 137 133 - 144 mmol/L    Potassium 3.6 3.4 - 5.3 mmol/L    Chloride 104 94 - 109 mmol/L    Carbon Dioxide 24 20 - 32 mmol/L    Anion Gap 9 3 - 14 mmol/L    Glucose 127 (H) 70 - 99 mg/dL    Urea Nitrogen 9 7 - 30 mg/dL    Creatinine 0.72 0.66 - 1.25 mg/dL    GFR Estimate >90 >60 mL/min/[1.73_m2]    GFR Estimate If Black >90 >60 mL/min/[1.73_m2]    Calcium 9.6 8.5 - 10.1 mg/dL    Bilirubin Total 0.4 0.2 - 1.3 mg/dL    Albumin 4.1 3.4 - 5.0 g/dL    Protein Total 8.0 6.8 - 8.8 g/dL    Alkaline Phosphatase 100 40 - 150 U/L    ALT 75 (H) 0 - 70 U/L    AST 47 (H) 0 - 45 U/L   Lipase     Status: None   Result Value Ref Range    Lipase 113 73 - 393 U/L   Troponin I     Status: None   Result Value Ref Range    Troponin I ES <0.015 0.000 - 0.045 ug/L   EKG 12-lead, tracing only     Status: None (Preliminary result)   Result Value Ref Range    Interpretation ECG Click View Image link to view waveform and result      Medications   lidocaine (XYLOCAINE) 2 % 15 mL, alum & mag hydroxide-simethicone (MAALOX) 15 mL GI Cocktail (30 mLs Oral Given 4/8/21 6034)        Assessments & Plan (with Medical  Decision Making)   Patient with history of GERD and H. pylori infection, presents to the ED with complaint of chest pain.  Occurred after eating.  Feels like a sharp sensation pain from his abdomen up into his neck.  Associated with some nausea, diaphoresis, anxiety.    Differential diagnosis includes ACS, GERD/gastritis, peptic ulcer disease, aortic dissection, PE    On arrival, patient mildly tachycardic, fairly anxious, blood pressure 149/99.  Saturating 100% on room air.  On exam, his lungs are clear to auscultation.  No signs respiratory distress.  Heart with a regular rate and rhythm.  There is mild tenderness in the midepigastric area without peritoneal signs.  PERC negative    Plan for cardiac work-up including CBC, BMP, troponin, EKG, chest x-ray.  Will treat with GI cocktail.    EKG shows normal sinus rhythm.  Troponin negative x1.  Chest x-ray clear.  Remainder of laboratory work-up unremarkable.    On reassessment, patient symptoms have completely resolved.  His repeat examination is unremarkable.  He notes that he is fairly anxious earlier and it may contribute to his symptoms.  He also admits that he has been eating too much spicy food recently.  His symptoms are likely consistent with GERD.  Unlikely to represent ACS, do not feel that serial enzymes are indicated.  Low heart score.     He is already on omeprazole.  Will add Carafate.  Will be discharged in good condition with PCP follow-up and educated on reasons to return to the emergency department.    I have reviewed the nursing notes. I have reviewed the findings, diagnosis, plan and need for follow up with the patient.    New Prescriptions    SUCRALFATE (CARAFATE) 1 GM TABLET    Take 1 tablet (1 g) by mouth 4 times daily       Final diagnoses:   Gastroesophageal reflux disease with esophagitis without hemorrhage     IMaura am serving as a trained medical scribe to document services personally performed by Mckay Reyes DO, based on the  provider's statements to me.      I, Mckay Reyes DO, was physically present and have reviewed and verified the accuracy of this note documented by Maura Cotter.    --  Mckay Reyes DO  [facility] EMERGENCY DEPARTMENT  4/8/2021     Mckay Reyes DO  04/09/21 0033

## 2021-04-09 NOTE — ED TRIAGE NOTES
Pt BIB [facility]. EMS reports pt has had recent stress from his mother passing away. Has not slept well lately. EMS reports pt tonight had a spicy and high fat content meal. After the meal, EMS reports pt developed chest pain. EMS reports pt having burning pain from his abdomen up to his throat.

## 2021-04-11 ENCOUNTER — HEALTH MAINTENANCE LETTER (OUTPATIENT)
Age: 41
End: 2021-04-11

## 2021-04-19 ENCOUNTER — OFFICE VISIT (OUTPATIENT)
Dept: ORTHOPEDICS | Facility: CLINIC | Age: 41
End: 2021-04-19
Payer: COMMERCIAL

## 2021-04-19 ENCOUNTER — ANCILLARY PROCEDURE (OUTPATIENT)
Dept: GENERAL RADIOLOGY | Facility: CLINIC | Age: 41
End: 2021-04-19
Attending: PEDIATRICS
Payer: COMMERCIAL

## 2021-04-19 VITALS
BODY MASS INDEX: 23.49 KG/M2 | SYSTOLIC BLOOD PRESSURE: 130 MMHG | DIASTOLIC BLOOD PRESSURE: 88 MMHG | HEIGHT: 68 IN | WEIGHT: 155 LBS

## 2021-04-19 DIAGNOSIS — M25.561 CHRONIC PAIN OF RIGHT KNEE: ICD-10-CM

## 2021-04-19 DIAGNOSIS — M25.561 CHRONIC PAIN OF RIGHT KNEE: Primary | ICD-10-CM

## 2021-04-19 DIAGNOSIS — G89.29 CHRONIC PAIN OF RIGHT KNEE: Primary | ICD-10-CM

## 2021-04-19 DIAGNOSIS — G89.29 CHRONIC PAIN OF RIGHT KNEE: ICD-10-CM

## 2021-04-19 PROCEDURE — 73562 X-RAY EXAM OF KNEE 3: CPT | Performed by: RADIOLOGY

## 2021-04-19 PROCEDURE — 99204 OFFICE O/P NEW MOD 45 MIN: CPT | Performed by: PEDIATRICS

## 2021-04-19 ASSESSMENT — MIFFLIN-ST. JEOR: SCORE: 1582.58

## 2021-04-19 NOTE — PROGRESS NOTES
ASSESSMENT & PLAN    Ysabel was seen today for pain.    Diagnoses and all orders for this visit:    Chronic pain of right knee  -     XR Knee Standing AP Bilat [place] Bilat Lat Right; Future  -     MR Knee Right w/o Contrast; Future      This issue is chronic and Worsening.  Chronic ACL tear, with increasing symptoms over time. Laxity difficult to assess today with degree of guarding, but appears present.  We discussed the following: symptom treatment, activity modification/rest, imaging, rehab, injection therapy, support for the affected area and referral to ortho surgeon. Following discussion, plan:  MRI next, evaluate for potential other pathology besides ACL tear. If chronic laxity and remainder of joint holding up well, question candidacy for surgery. If primarily arthrosis, then we discussed possible different treatment path with therapy, injections.  He has a brace already, may use for comfort.  Contact pt with MRI results.  Questions answered. Discussed signs and symptoms that may indicate more serious issues; the patient was instructed to seek appropriate care if noted. Ysabel indicates understanding of these issues and agrees with the plan.        See Patient Instructions  Patient Instructions   MRI right knee next  Will plan to contact you with MRI results  Future considerations may include physical therapy, bracing, possibly injection    Advanced imaging is done by appointment. Some insurance companies may require a prior authorization to be completed which can delay the time until you are able to schedule your appointment.   Please call [place] [place] [facility] [place]: [phone] to schedule your MRI.  Depending on your availability you can usually schedule within the next 1-2 days.  If you are active on [employer], you may have access to your test results before your provider is able to review the study and advise on next steps.      The clinic will call you with results, if you have not heard from  "the clinic within 3-4 days following your MRI please contact us at the number listed below.       If you have any further questions for your physician or physician s care team you can call [phone] and use option 3 to leave a voice message. Calls received during business hours will be returned same day.        Alfredo Fuller DO  [facility]    -----  Chief Complaint   Patient presents with     Right Knee - Pain       SUBJECTIVE  Ysabel Diggs is a/an 40 year old male who is seen as a self referral for evaluation of right knee pain.  He was told he had an ACL tear about 10 years ago.  He decided not to have surgery. Pain has been increasing recently.  Difficult to get comfortable at night.    Did not do any treatment following his knee injury .     The patient is seen with their wife.      Onset: 10 years(s) ago. Patient describes injury as injury while playing soccer   Location of Pain: right knee   Worsened by: activity   Better with: epsom salts, hot bath   Treatments tried: rest/activity avoidance and ice  Associated symptoms: swelling, locking or catching and feeling of instability    Orthopedic/Surgical history: YES - Date: 10 years ago torn ACL   Social History/Occupation: unemployed     No family history pertinent to patient's problem today.     **  + nighttime pain, difficult to do stairs. Sometimes swelling.  Feels some joint noise.  Gets warm swollen if trying to play soccer.  Limps sometimes, especially if trying to run.  REVIEW OF SYSTEMS:  Review of Systems   All other systems reviewed and are negative.        OBJECTIVE:  /88   Ht 1.727 m (5' 8\")   Wt 70.3 kg (155 lb)   BMI 23.57 kg/m     General: healthy, alert and in no distress  HEENT: no scleral icterus or conjunctival erythema  Skin: no suspicious lesions or rash. No jaundice.  CV: distal perfusion intact   Resp: normal respiratory effort without conversational dyspnea   Psych: normal mood " and affect  Gait: normal steady gait with appropriate coordination and balance   Neuro: Normal light sensory exam of lower extremity     Right Knee exam    Inspection:   No clear effusion   no ecchymosis    ROM:      Full active and passive ROM with flexion and extension    Patellar Motion:      Normal patellar tracking noted through range of motion    Tender:      lateral joint line    Non Tender:       remainder of knee area    Special Tests:      Some pain laterally with Qi       positive (+) Lachman, though somewhat difficult to assess with prominent guarding       Equivocal anterior drawer, prominent guarding       neg (-) posterior drawer       neg (-) varus       neg (-) valgus       + mild pain with forced extension    Evaluation of ipsilateral kinetic chain:         RADIOLOGY:  I independently ordered, visualized and reviewed these images with the patient  Minimal lateral compartment narrowing right compared to left. No acute bony abnormality.    Recent Results (from the past 24 hour(s))   XR Knee Standing AP Bilat [place] Bilat Lat Right    Narrative    XR KNEE STANDING AP BILAT [place] BILAT LAT RT 4/19/2021 2:02 PM     HISTORY: Chronic pain of right knee; Chronic pain of right knee      Impression    IMPRESSION: Negative exam.    MAVERICK FERGUSON MD           Review of the result(s) of each unique test - imaging  Independent interpretation of a test performed by another physician/other qualified health care professional (not separately reported) - imaging  Ordering of each unique test  25 minutes spent on the date of the encounter doing chart review, history and exam, documentation and further activities per the note

## 2021-04-19 NOTE — LETTER
4/19/2021         RE: Ysabel Diggs  [address]  [place] [address]        Dear Colleague,    Thank you for referring your patient, Ysabel Diggs, to the [facility]. Please see a copy of my visit note below.    ASSESSMENT & PLAN    Ysabel was seen today for pain.    Diagnoses and all orders for this visit:    Chronic pain of right knee  -     XR Knee Standing AP Bilat [place] Bilat Lat Right; Future  -     MR Knee Right w/o Contrast; Future      This issue is chronic and Worsening.  Chronic ACL tear, with increasing symptoms over time. Laxity difficult to assess today with degree of guarding, but appears present.  We discussed the following: symptom treatment, activity modification/rest, imaging, rehab, injection therapy, support for the affected area and referral to ortho surgeon. Following discussion, plan:  MRI next, evaluate for potential other pathology besides ACL tear. If chronic laxity and remainder of joint holding up well, question candidacy for surgery. If primarily arthrosis, then we discussed possible different treatment path with therapy, injections.  He has a brace already, may use for comfort.  Contact pt with MRI results.  Questions answered. Discussed signs and symptoms that may indicate more serious issues; the patient was instructed to seek appropriate care if noted. Ysabel indicates understanding of these issues and agrees with the plan.        See Patient Instructions  Patient Instructions   MRI right knee next  Will plan to contact you with MRI results  Future considerations may include physical therapy, bracing, possibly injection    Advanced imaging is done by appointment. Some insurance companies may require a prior authorization to be completed which can delay the time until you are able to schedule your appointment.   Please call [place] [place] [facility] [place]: [phone] to schedule your MRI.  Depending on your  "availability you can usually schedule within the next 1-2 days.  If you are active on [employer], you may have access to your test results before your provider is able to review the study and advise on next steps.      The clinic will call you with results, if you have not heard from the clinic within 3-4 days following your MRI please contact us at the number listed below.       If you have any further questions for your physician or physician s care team you can call [phone] and use option 3 to leave a voice message. Calls received during business hours will be returned same day.        Alfredo Fuller DO  [facility]    -----  Chief Complaint   Patient presents with     Right Knee - Pain       SUBJECTIVE  Ysabel Diggs is a/an 40 year old male who is seen as a self referral for evaluation of right knee pain.  He was told he had an ACL tear about 10 years ago.  He decided not to have surgery. Pain has been increasing recently.  Difficult to get comfortable at night.    Did not do any treatment following his knee injury .     The patient is seen with their wife.      Onset: 10 years(s) ago. Patient describes injury as injury while playing soccer   Location of Pain: right knee   Worsened by: activity   Better with: epsom salts, hot bath   Treatments tried: rest/activity avoidance and ice  Associated symptoms: swelling, locking or catching and feeling of instability    Orthopedic/Surgical history: YES - Date: 10 years ago torn ACL   Social History/Occupation: unemployed     No family history pertinent to patient's problem today.     **  + nighttime pain, difficult to do stairs. Sometimes swelling.  Feels some joint noise.  Gets warm swollen if trying to play soccer.  Limps sometimes, especially if trying to run.  REVIEW OF SYSTEMS:  Review of Systems   All other systems reviewed and are negative.        OBJECTIVE:  /88   Ht 1.727 m (5' 8\")   Wt 70.3 kg (155 lb)   " BMI 23.57 kg/m     General: healthy, alert and in no distress  HEENT: no scleral icterus or conjunctival erythema  Skin: no suspicious lesions or rash. No jaundice.  CV: distal perfusion intact   Resp: normal respiratory effort without conversational dyspnea   Psych: normal mood and affect  Gait: normal steady gait with appropriate coordination and balance   Neuro: Normal light sensory exam of lower extremity     Right Knee exam    Inspection:   No clear effusion   no ecchymosis    ROM:      Full active and passive ROM with flexion and extension    Patellar Motion:      Normal patellar tracking noted through range of motion    Tender:      lateral joint line    Non Tender:       remainder of knee area    Special Tests:      Some pain laterally with Qi       positive (+) Lachman, though somewhat difficult to assess with prominent guarding       Equivocal anterior drawer, prominent guarding       neg (-) posterior drawer       neg (-) varus       neg (-) valgus       + mild pain with forced extension    Evaluation of ipsilateral kinetic chain:         RADIOLOGY:  I independently ordered, visualized and reviewed these images with the patient  Minimal lateral compartment narrowing right compared to left. No acute bony abnormality.    Recent Results (from the past 24 hour(s))   XR Knee Standing AP Bilat [place] Bilat Lat Right    Narrative    XR KNEE STANDING AP BILAT [place] BILAT LAT RT 4/19/2021 2:02 PM     HISTORY: Chronic pain of right knee; Chronic pain of right knee      Impression    IMPRESSION: Negative exam.    MAVERICK FERGUSON MD           Review of the result(s) of each unique test - imaging  Independent interpretation of a test performed by another physician/other qualified health care professional (not separately reported) - imaging  Ordering of each unique test  25 minutes spent on the date of the encounter doing chart review, history and exam, documentation and further activities per the note              Again, thank you for allowing me to participate in the care of your patient.        Sincerely,        Alfredo Fuller, DO

## 2021-04-19 NOTE — PATIENT INSTRUCTIONS
MRI right knee next  Will plan to contact you with MRI results  Future considerations may include physical therapy, bracing, possibly injection    Advanced imaging is done by appointment. Some insurance companies may require a prior authorization to be completed which can delay the time until you are able to schedule your appointment.   Please call [place] [place] [facility] [place]: [phone] to schedule your MRI.  Depending on your availability you can usually schedule within the next 1-2 days.  If you are active on [employer], you may have access to your test results before your provider is able to review the study and advise on next steps.      The clinic will call you with results, if you have not heard from the clinic within 3-4 days following your MRI please contact us at the number listed below.       If you have any further questions for your physician or physician s care team you can call [phone] and use option 3 to leave a voice message. Calls received during business hours will be returned same day.

## 2021-05-03 ENCOUNTER — ANCILLARY PROCEDURE (OUTPATIENT)
Dept: MRI IMAGING | Facility: CLINIC | Age: 41
End: 2021-05-03
Attending: PEDIATRICS
Payer: COMMERCIAL

## 2021-05-03 DIAGNOSIS — M25.561 CHRONIC PAIN OF RIGHT KNEE: ICD-10-CM

## 2021-05-03 DIAGNOSIS — G89.29 CHRONIC PAIN OF RIGHT KNEE: ICD-10-CM

## 2021-05-03 PROCEDURE — 73721 MRI JNT OF LWR EXTRE W/O DYE: CPT | Mod: RT | Performed by: RADIOLOGY

## 2021-05-04 ENCOUNTER — TELEPHONE (OUTPATIENT)
Dept: ORTHOPEDICS | Facility: CLINIC | Age: 41
End: 2021-05-04

## 2021-05-04 DIAGNOSIS — M17.31 POST-TRAUMATIC OSTEOARTHRITIS OF RIGHT KNEE: Primary | ICD-10-CM

## 2021-05-04 DIAGNOSIS — S83.511A CHRONIC RUPTURE OF ANTERIOR CRUCIATE LIGAMENT OF RIGHT KNEE: ICD-10-CM

## 2021-05-04 NOTE — TELEPHONE ENCOUNTER
Results for orders placed or performed in visit on 05/03/21   MR Knee Right w/o Contrast    Narrative    EXAMINATION: MRI of the right knee without contrast    DATE:  5/3/2021    HISTORY: Knee instability    TECHNIQUE: Multiplanar, multisequence MR imaging of the right knee was  obtained using standard sequences in 3 orthogonal planes without the  use of intravenous or intra-articular gadolinium contrast.    Comparison:  Comparison radiographs dated 4/19/2021 were reviewed,  which demonstrated no acute bone abnormality.    FINDINGS:    In the medial compartment, there is tearing of the posterior horn of  the medial meniscus, extending to the superior and inferior articular  surface. There is no high-grade or full-thickness cartilage loss or  subchondral edema.    In the lateral compartment, there is complex tearing of the lateral  meniscus involving the anterior horn with projecting in the body of  the lateral meniscus. There is moderate grade cartilage loss along the  far lateral aspect of the lateral femoral condyle and lateral tibial  plateau, with focal areas of full-thickness cartilage fissuring and  subchondral edema along the posterior aspect of the lateral tibial  plateau.    In the patellofemoral compartment, there is a focus of high-grade to  full-thickness cartilage loss along the central to lateral trochlear  surface, measuring at least 9 mm.     The posterior cruciate ligament is intact.     There is a full-thickness tear of the anterior cruciate ligament with  minimal residual fibers, presumed to be chronic.    The tibial collateral ligament is intact. The anterior iliotibial  band, fibular collateral ligament, biceps femoris tendon, and  popliteus tendons are intact.    There is minimal fluid in the right knee joint. Trace fluid in the  semimembranosus medial gastrocnemius bursa. No joint bodies.    The extensor mechanism is intact and normal in appearance.       Impression    IMPRESSION:  1.  Full-thickness tearing of the right knee anterior cruciate ligament  with minimal residual fibers remaining, findings are presumed to be  secondary to a remote injury. No bony contusion is noted.   2. Tearing of the posterior horn of the right knee medial meniscus,  extending to the superior and inferior articular surface.  3. Complex tearing of the right knee lateral meniscus with a tear  involving the anterior horn as well as free edge fraying of the body  of the meniscus.  4. The posterior cruciate ligament and medial and lateral supporting  structures are intact.  5. At least moderate grade osteoarthrosis in the right knee, with  areas of full-thickness cartilage loss, greatest along the central to  lateral trochlear surface and far lateral aspect of the lateral tibial  plateau.    HENOK SUERO MD

## 2021-05-05 NOTE — TELEPHONE ENCOUNTER
"Chronic ACL tear. Also with medial and lateral meniscus tearing, and articular cartilage wearing in the lateral and patellofemoral compartments (degenerative change, or \"arthritis\").  Options: 1) physical therapy (not a \"cure\" for the tearing, but can help with pain and function); 2) steroid injection (for pain, not a \"cure\" for the tearing); 3) referral to discuss further with ortho surgeon (however, question whether he would be candidate for ACL reconstruction given the degenerative change present).  Could certainly try injection and therapy first, if interested. Would offer this, or at least recheck appointment to review. However, if he wants to discuss further with surgeon, can refer.  I would be happy to have a visit with the patient (in person, by video, or by phone) to discuss further if that would be helpful.  Thanks.  Alfredo Fuller DO, CAQ      "

## 2021-05-11 NOTE — TELEPHONE ENCOUNTER
Called and spoke with patient.  Relayed results and options.  He would like to discuss with a surgeon.  Referral placed    Marissa Gayle MS ATC

## 2021-05-19 ENCOUNTER — OFFICE VISIT (OUTPATIENT)
Dept: ORTHOPEDICS | Facility: CLINIC | Age: 41
End: 2021-05-19
Attending: PEDIATRICS
Payer: COMMERCIAL

## 2021-05-19 VITALS
HEIGHT: 68 IN | DIASTOLIC BLOOD PRESSURE: 92 MMHG | HEART RATE: 84 BPM | BODY MASS INDEX: 25.04 KG/M2 | SYSTOLIC BLOOD PRESSURE: 137 MMHG | WEIGHT: 165.2 LBS

## 2021-05-19 DIAGNOSIS — S83.511A CHRONIC RUPTURE OF ANTERIOR CRUCIATE LIGAMENT OF RIGHT KNEE: ICD-10-CM

## 2021-05-19 DIAGNOSIS — M17.31 POST-TRAUMATIC OSTEOARTHRITIS OF RIGHT KNEE: Primary | ICD-10-CM

## 2021-05-19 PROCEDURE — 99243 OFF/OP CNSLTJ NEW/EST LOW 30: CPT | Performed by: ORTHOPAEDIC SURGERY

## 2021-05-19 ASSESSMENT — MIFFLIN-ST. JEOR: SCORE: 1628.84

## 2021-05-19 ASSESSMENT — PAIN SCALES - GENERAL: PAINLEVEL: WORST PAIN (10)

## 2021-05-19 NOTE — PROGRESS NOTES
"CHIEF COMPLAINT:   Chief Complaint   Patient presents with     Right Knee - Pain     Hx of soccer injury 10 years ago - ACL tear. Onset: on and off 10 years but now pain is constant. Pain is all over the knee, \"inside\" the knee. Pain increases with walking or standing for a period of time. He uses bioflex. No tx.    .    Ysabel Diggs is seen today in the [facility] for evaluation of right knee pain at the request of Dr. Alfredo Fuller           HISTORY OF PRESENT ILLNESS    Ysabel Diggs is a 40 year old male seen for evaluation of ongoing right knee pain with a previous known injury.   Pain has been present for 10-15 years. Reports soccer injury 10-15 years ago, anterior cruciate ligament tear, no treatments. Was recommended surgery but was busy with school and stuff. Now the pain is worse, constant. Locates pain \"inside the knee\". Pain with prolonged walking, standing, running. Treatment with bioflex, otherwise occasional tylenol/ibuprofen but that's more for headache.    Occasional catching, giving out, history unclear.      Pain with rest, night. Will get swollen with a lot of activities.    Present symptoms: pain diffuse, pain sharp, dull/achy , severe pain.    Pain severity: 10/10  Frequency of symptoms: are constant  Exacerbating Factors: weight bearing, running  Relieving Factors: icy hot, brace  Night Pain: Yes  Pain while at rest: Yes     Other PMH:  has a past medical history of Esophagitis (June 2010), Gastric ulcer, unspecified as acute or chronic, without mention of hemorrhage or perforation (June 2010), H. pylori infection (March 2010), Plantar warts, and Smoking.  Patient Active Problem List   Diagnosis     H. pylori infection     Smoking     CARDIOVASCULAR SCREENING; LDL GOAL LESS THAN 160       Surgical Hx:  has a past surgical history that includes APPENDECTOMY (2005) and upper gi endoscopy (June 2010).    Medications:   Current Outpatient Medications: " "     ibuprofen (ADVIL/MOTRIN) 600 MG tablet, Take 1 tablet (600 mg) by mouth every 6 hours as needed for moderate pain, Disp: 90 tablet, Rfl: 0     sucralfate (CARAFATE) 1 GM tablet, Take 1 tablet (1 g) by mouth 4 times daily, Disp: 28 tablet, Rfl: 0    Allergies: No Known Allergies    Social Hx: self-employed, now unemployment.   reports that he has been smoking cigarettes. He has been smoking about 0.50 packs per day. He has never used smokeless tobacco. He reports current alcohol use. He reports that he does not use drugs.    Family Hx: family history includes Diabetes in his father; Hypertension in his mother.    REVIEW OF SYSTEMS: 10 point ROS neg other than the symptoms noted above in the HPI and PMH. Notables include  CONSTITUTIONAL:NEGATIVE for fever, chills, change in weight  INTEGUMENTARY/SKIN: NEGATIVE for worrisome rashes, moles or lesions  MUSCULOSKELETAL:See HPI above  NEURO: NEGATIVE for weakness, dizziness or paresthesias    PHYSICAL EXAM:  BP (!) 137/92   Pulse 84   Ht 1.727 m (5' 8\")   Wt 74.9 kg (165 lb 3.2 oz)   BMI 25.12 kg/m     GENERAL APPEARANCE: healthy, alert, no distress  SKIN: no suspicious lesions or rashes  NEURO: Normal strength and tone, mentation intact and speech normal  PSYCH:  mentation appears normal and affect normal, not anxious  RESPIRATORY: No increased work of breathing.  HANDS: no clubbing, nail pitting  LYMPH: no palpable popliteal lymphadenopathy.    BILATERAL LOWER EXTREMITIES:  Gait: favors the right.  Alignment: varus  No gross deformities or masses.  Bilateral  Quad atrophy, strength normal.  Intact sensation deep peroneal nerve, superficial peroneal nerve, med/lat tibial nerve, sural nerve, saphenous nerve  Intact EHL, EDL, TA, FHL, GS, quadriceps hamstrings and hip flexors  Toes warm and well perfused, brisk capillary refill. Palpable 2+ dp pulses.  Bilateral calf soft and nttp or squeeze.  DTRs: achilles 2+, patella 2+.  Edema: none    LEFT KNEE EXAM:    Skin: " intact, no ecchymosis or erythema  Squat: 100 %, not limited by pain.     ROM: full extension to 140+ flexion  Tight hamstrings on straight leg raise.  Effusion: none  Tender: NTTP med/lat joint line, anterior or posterior knee    MCL: stable, and non-painful at both 0 and 30 degrees knee flexion  Varus stress: stable, and non-painful at both 0 and 30 degrees knee flexion  Lachmans: neg, firm endpoint  Posterior Drawer stable  Patellofemoral joint:                Apprehension: negative              Crepitations: minimal    RIGHT KNEE EXAM:    Skin: intact, no ecchymosis or erythema  Squat: 100 %, not limited by pain.     ROM: full extension to 140+ flexion  Tight hamstrings on straight leg raise.  Effusion: none  Tender: medial joint line, lateral joint line    MCL: stable, and non-painful at both 0 and 30 degrees knee flexion  Varus stress: stable, and non-painful at both 0 and 30 degrees knee flexion  Lachmans: 2B laxity, guarded. Patient not able to relax.  Posterior Drawer stable  Patellofemoral joint:                Apprehension: negative              Crepitations: mild   Grind: positive.    X-RAY: bilateral rosenbaum and sunrise views, lateral views right knee from 4/19/2021 were reviewed in clinic today. On my review, no obvious fractures or dislocations. Right knee with lateral compartment mild narrowing, left knee with mild medial compartment narrowing.    MRI:  MRI right knee from 5/3/2021 was reviewed in clinic today.    1. Full-thickness tearing of the right knee anterior cruciate ligament  with minimal residual fibers remaining, findings are presumed to be  secondary to a remote injury. No bony contusion is noted.   2. Tearing of the posterior horn of the right knee medial meniscus,  extending to the superior and inferior articular surface.  3. Complex tearing of the right knee lateral meniscus with a tear  involving the anterior horn as well as free edge fraying of the body  of the meniscus.  4. The  posterior cruciate ligament and medial and lateral supporting  structures are intact.  5. At least moderate grade osteoarthrosis in the right knee, with  areas of full-thickness cartilage loss, greatest along the central to  lateral trochlear surface and far lateral aspect of the lateral tibial  plateau.         ASSESSMENT/PLAN: Ysabel Diggs is a 40 year old male with chronic right knee pain, post-traumatic osteoarthritis, chronic anterior cruciate ligament tear, complex medial and lateral meniscus tears.     * exam, images discussed  * consistent with chronic anterior cruciate ligament insufficiency with resultant degenerative changes. Suspect complex degenerative meniscal tears as a result as well.  * unclear if symptoms related to meniscal tears, underlying degenerative changes, anterior cruciate ligament insufficiency. Doesn't seem like instability is a major concern but more pain.  * might benefit from meniscal debridement.  * consider anterior cruciate ligament reconstruction, but given some chondral changes patello-femoral and lateral, also possibly could be treated with chondral preservation surgery.  * would recommend referral to [facility] knee specialist Dr Beard.  * return to clinic as needed.      Drew Escobar M.D., M.S.  Dept. of Orthopaedic Surgery  [facility]

## 2021-05-19 NOTE — LETTER
"    5/19/2021         RE: Ysabel Diggs  [address]  [place] [address]        Dear Colleague,    Thank you for referring your patient, Ysabel Diggs, to the [facility]. Please see a copy of my visit note below.    CHIEF COMPLAINT:   Chief Complaint   Patient presents with     Right Knee - Pain     Hx of soccer injury 10 years ago - ACL tear. Onset: on and off 10 years but now pain is constant. Pain is all over the knee, \"inside\" the knee. Pain increases with walking or standing for a period of time. He uses bioflex. No tx.    .    Ysabel Diggs is seen today in the [facility] for evaluation of right knee pain at the request of Dr. Alfredo Fuller           HISTORY OF PRESENT ILLNESS    Ysabel Diggs is a 40 year old male seen for evaluation of ongoing right knee pain with a previous known injury.   Pain has been present for 10-15 years. Reports soccer injury 10-15 years ago, anterior cruciate ligament tear, no treatments. Was recommended surgery but was busy with school and stuff. Now the pain is worse, constant. Locates pain \"inside the knee\". Pain with prolonged walking, standing, running. Treatment with bioflex, otherwise occasional tylenol/ibuprofen but that's more for headache.    Occasional catching, giving out, history unclear.      Pain with rest, night. Will get swollen with a lot of activities.    Present symptoms: pain diffuse, pain sharp, dull/achy , severe pain.    Pain severity: 10/10  Frequency of symptoms: are constant  Exacerbating Factors: weight bearing, running  Relieving Factors: icy hot, brace  Night Pain: Yes  Pain while at rest: Yes     Other PMH:  has a past medical history of Esophagitis (June 2010), Gastric ulcer, unspecified as acute or chronic, without mention of hemorrhage or perforation (June 2010), H. pylori infection (March 2010), Plantar warts, and Smoking.  Patient Active Problem List " "  Diagnosis     H. pylori infection     Smoking     CARDIOVASCULAR SCREENING; LDL GOAL LESS THAN 160       Surgical Hx:  has a past surgical history that includes APPENDECTOMY (2005) and upper gi endoscopy (June 2010).    Medications:   Current Outpatient Medications:      ibuprofen (ADVIL/MOTRIN) 600 MG tablet, Take 1 tablet (600 mg) by mouth every 6 hours as needed for moderate pain, Disp: 90 tablet, Rfl: 0     sucralfate (CARAFATE) 1 GM tablet, Take 1 tablet (1 g) by mouth 4 times daily, Disp: 28 tablet, Rfl: 0    Allergies: No Known Allergies    Social Hx: self-employed, now unemployment.   reports that he has been smoking cigarettes. He has been smoking about 0.50 packs per day. He has never used smokeless tobacco. He reports current alcohol use. He reports that he does not use drugs.    Family Hx: family history includes Diabetes in his father; Hypertension in his mother.    REVIEW OF SYSTEMS: 10 point ROS neg other than the symptoms noted above in the HPI and PMH. Notables include  CONSTITUTIONAL:NEGATIVE for fever, chills, change in weight  INTEGUMENTARY/SKIN: NEGATIVE for worrisome rashes, moles or lesions  MUSCULOSKELETAL:See HPI above  NEURO: NEGATIVE for weakness, dizziness or paresthesias    PHYSICAL EXAM:  BP (!) 137/92   Pulse 84   Ht 1.727 m (5' 8\")   Wt 74.9 kg (165 lb 3.2 oz)   BMI 25.12 kg/m     GENERAL APPEARANCE: healthy, alert, no distress  SKIN: no suspicious lesions or rashes  NEURO: Normal strength and tone, mentation intact and speech normal  PSYCH:  mentation appears normal and affect normal, not anxious  RESPIRATORY: No increased work of breathing.  HANDS: no clubbing, nail pitting  LYMPH: no palpable popliteal lymphadenopathy.    BILATERAL LOWER EXTREMITIES:  Gait: favors the right.  Alignment: varus  No gross deformities or masses.  Bilateral  Quad atrophy, strength normal.  Intact sensation deep peroneal nerve, superficial peroneal nerve, med/lat tibial nerve, sural nerve, " saphenous nerve  Intact EHL, EDL, TA, FHL, GS, quadriceps hamstrings and hip flexors  Toes warm and well perfused, brisk capillary refill. Palpable 2+ dp pulses.  Bilateral calf soft and nttp or squeeze.  DTRs: achilles 2+, patella 2+.  Edema: none    LEFT KNEE EXAM:    Skin: intact, no ecchymosis or erythema  Squat: 100 %, not limited by pain.     ROM: full extension to 140+ flexion  Tight hamstrings on straight leg raise.  Effusion: none  Tender: NTTP med/lat joint line, anterior or posterior knee    MCL: stable, and non-painful at both 0 and 30 degrees knee flexion  Varus stress: stable, and non-painful at both 0 and 30 degrees knee flexion  Lachmans: neg, firm endpoint  Posterior Drawer stable  Patellofemoral joint:                Apprehension: negative              Crepitations: minimal    RIGHT KNEE EXAM:    Skin: intact, no ecchymosis or erythema  Squat: 100 %, not limited by pain.     ROM: full extension to 140+ flexion  Tight hamstrings on straight leg raise.  Effusion: none  Tender: medial joint line, lateral joint line    MCL: stable, and non-painful at both 0 and 30 degrees knee flexion  Varus stress: stable, and non-painful at both 0 and 30 degrees knee flexion  Lachmans: 2B laxity, guarded. Patient not able to relax.  Posterior Drawer stable  Patellofemoral joint:                Apprehension: negative              Crepitations: mild   Grind: positive.    X-RAY: bilateral rosenbaum and sunrise views, lateral views right knee from 4/19/2021 were reviewed in clinic today. On my review, no obvious fractures or dislocations. Right knee with lateral compartment mild narrowing, left knee with mild medial compartment narrowing.    MRI:  MRI right knee from 5/3/2021 was reviewed in clinic today.    1. Full-thickness tearing of the right knee anterior cruciate ligament  with minimal residual fibers remaining, findings are presumed to be  secondary to a remote injury. No bony contusion is noted.   2. Tearing of  the posterior horn of the right knee medial meniscus,  extending to the superior and inferior articular surface.  3. Complex tearing of the right knee lateral meniscus with a tear  involving the anterior horn as well as free edge fraying of the body  of the meniscus.  4. The posterior cruciate ligament and medial and lateral supporting  structures are intact.  5. At least moderate grade osteoarthrosis in the right knee, with  areas of full-thickness cartilage loss, greatest along the central to  lateral trochlear surface and far lateral aspect of the lateral tibial  plateau.         ASSESSMENT/PLAN: Ysabel Diggs is a 40 year old male with chronic right knee pain, post-traumatic osteoarthritis, chronic anterior cruciate ligament tear, complex medial and lateral meniscus tears.     * exam, images discussed  * consistent with chronic anterior cruciate ligament insufficiency with resultant degenerative changes. Suspect complex degenerative meniscal tears as a result as well.  * unclear if symptoms related to meniscal tears, underlying degenerative changes, anterior cruciate ligament insufficiency. Doesn't seem like instability is a major concern but more pain.  * might benefit from meniscal debridement.  * consider anterior cruciate ligament reconstruction, but given some chondral changes patello-femoral and lateral, also possibly could be treated with chondral preservation surgery.  * would recommend referral to [facility] knee specialist Dr Beard.  * return to clinic as needed.      Drew Escobar M.D., M.S.  Dept. of Orthopaedic Surgery  [facility]          Again, thank you for allowing me to participate in the care of your patient.        Sincerely,        Drew Escobar MD

## 2021-05-21 NOTE — TELEPHONE ENCOUNTER
DIAGNOSIS: Post-traumatic osteoarthritis of right knee, Chronic rupture of anterior cruciate ligament of right knee/MRI/Drew Escobar   APPOINTMENT DATE: 5.24.21   NOTES STATUS DETAILS   OFFICE NOTE from referring provider Internal 5.19.21 Dr Drew Escobar, [facility] Ortho   OFFICE NOTE from other specialist Internal 4.19.21 Dr Alfredo Fuller, [facility] Sports Med  1.15.18 Dr Leyla Chatman, [facility] FP  1.5.18   DISCHARGE SUMMARY from hospital N/A    DISCHARGE REPORT from the ER N/A    OPERATIVE REPORT N/A    EMG report N/A    MEDICATION LIST Internal    MRI Internal 5.3.21 R knee   DEXA (osteoporosis/bone health) N/A    CT SCAN N/A    XRAYS (IMAGES & REPORTS) Internal 4.19.21 B knee standing     Patient has been seen in the past but I'm not sure where. I will have [facility] ask right when they see patient and then I will print off an JUAN to have them sign before they leave.

## 2021-05-24 ENCOUNTER — PRE VISIT (OUTPATIENT)
Dept: ORTHOPEDICS | Facility: CLINIC | Age: 41
End: 2021-05-24

## 2021-05-27 ENCOUNTER — OFFICE VISIT (OUTPATIENT)
Dept: ORTHOPEDICS | Facility: CLINIC | Age: 41
End: 2021-05-27
Payer: COMMERCIAL

## 2021-05-27 ENCOUNTER — TELEPHONE (OUTPATIENT)
Dept: ORTHOPEDICS | Facility: CLINIC | Age: 41
End: 2021-05-27

## 2021-05-27 VITALS
BODY MASS INDEX: 25.01 KG/M2 | DIASTOLIC BLOOD PRESSURE: 92 MMHG | SYSTOLIC BLOOD PRESSURE: 136 MMHG | WEIGHT: 165 LBS | HEART RATE: 85 BPM | HEIGHT: 68 IN

## 2021-05-27 DIAGNOSIS — M17.31 POST-TRAUMATIC OSTEOARTHRITIS OF RIGHT KNEE: Primary | ICD-10-CM

## 2021-05-27 DIAGNOSIS — S83.511A CHRONIC RUPTURE OF ANTERIOR CRUCIATE LIGAMENT OF RIGHT KNEE: ICD-10-CM

## 2021-05-27 PROCEDURE — 99214 OFFICE O/P EST MOD 30 MIN: CPT | Performed by: ORTHOPAEDIC SURGERY

## 2021-05-27 ASSESSMENT — PAIN SCALES - GENERAL: PAINLEVEL: WORST PAIN (10)

## 2021-05-27 ASSESSMENT — MIFFLIN-ST. JEOR: SCORE: 1627.94

## 2021-05-27 NOTE — PATIENT INSTRUCTIONS
[facility]  [address] [place] [address]  Phone [phone]  Fax [phone]    SURGICAL INFORMATION & INSTRUCTIONS  Dr. Devyn Beard  Name of Surgery: Right knee ACL reconstruction with hamstring autograft    Date of Surgery: June 25th 2021    If you need to reschedule/schedule your surgery, please contact Merissa, our surgery scheduler at [facility], at [phone].    Arrival Time: 12:00pm    Time of Surgery:  1:30pm    Please arrive early so that we can prepare you for surgery. If you arrive later than your scheduled arrival time, your surgery may be cancelled.  Please note that scheduled times may change, but you will always be notified if there is a change.      Location of Surgery:     ? [facility] ([facility]  [address] [place] [address]  5th floor check-in  Phone [phone]  Fax [phone]  [email or web address]      Medical Leave Forms    ? Please fax any medical leave forms from your employer/school to [phone]. It can take up to 3 business days to complete the forms. We will fax them back to the number listed on the forms, if you would like a copy, please let us know and we will mail a copy to you. Do not bring with on day of surgery as the forms may get lost.    Prior to surgery    ? Call your insurance company  Ask if you need pre-approval for your surgery.  If pre-approval is needed, please call our surgery scheduler for assistance with the pre-approval process.   If you do not have insurance, please let us know.     ? Schedule an appointment with a Primary Care Provider for a Pre-Op Physical.  This should be done within 30 days of surgery  If you do not have a primary care provider, you may call [facility] at [phone], for an appointment.  Please have your office note and any labs or tests faxed to the facility where you are having surgery. Please be sure to request a copy of your  pre-op physical and bring it with you on the day of surgery.      Tell your provider if you have any of the following:   - IF you have a pacemaker or an ICD (implanted cardiac defibrillator). If you do, please bring the ID card with you on the day of surgery  - IF you're a smoker. People who smoke have a higher risk of infection after surgery. Ask your provider how you can quit smoking.  - If you have diabetes, work with your provider to control your blood sugar. If its not well-controlled, we may need to delay surgery (or you may have problems healing afterward).  - If your surgeon asks you to see your dentist: You'll need to complete any dental work before surgery. Your dentist must send a letter to your surgery center saying it's ok to do the surgery.    ? Pre-Op Phone Call  You will receive a pre-op phone call 1-3 days before surgery to review your eating and drinking restrictions, review medications, and confirm surgery times.      ? 7-10 days BEFORE surgery  ? Stop taking aspirin, Plavix or aspirin products 10 days before surgery or as directed by your doctor.  ? Stop taking non-steroidal anti-inflammatory medications (naproxen/Aleve, ibuprofen/Advil/Motrin, celecoxib/Celebrex, meloxicam/Mobic) 3 days before surgery or as directed by your surgeon.  This will reduce the risk of bleeding during surgery.  ? Stop taking fish oil (Omega-3-fatty acid) 1 week before surgery.  ? It is OK to take acetaminophen (Tylenol) up until 2 hours prior to surgery.  ? Take prescription medications as directed by your doctor. Discuss which medications to take or hold prior to your surgery, with your primary care doctor.  ? If you have diabetes, ask your primary care doctor or endocrinologist how you should take your medication(s).    ? COVID-19 Testing Prior to Surgery (see included handout)  o 3-4 days prior to surgery  o Call [phone] or [phone] to schedule  o Please stay at home and out of contact with other people  after your COVID test    ? 24 hours BEFORE surgery  Stop drinking alcohol (beer, wine, liquor) at least 24-hours before and after your surgery.     ? Evening BEFORE surgery  - You may eat a normal meal the night before surgery, but eat nothing after midnight.     - Take a shower - to help wash away bacteria (germs) from your skin.  It s normal to have bacteria on your skin and skin protects us from these germs.  When you have surgery, we cut the skin.  Sometimes germs get into the cuts and cause infection (illness caused by germs).  By following the showering instructions and using the special soap, you will lower the number of germs on your skin.  This decreases your chance of an infection.    - Buy or get 8 ounces of antiseptic surgical soap called 4% CHG.  Common brands of this soap are Hibiclens and Exidine.    - You can find it this soap at your local pharmacy, clinic or retail store.  If you have trouble finding it, ask your pharmacist to help you find the right substitute.  OK to use generic unscented soap if not able to purchase surgical soap.  - Do not shave within 12 inches of your incision (surgical cut) area for at least 3 days before surgery.  Shaving can make small cuts in the skin. This puts you at a higher risk of infection.    Items you will need for each shower:   - Newly washed towel  - 4 ounces of one of the recommended soaps    Follow these instructions, the evening before surgery  - Wash your hair and body with your regular shampoo and soap. Make sure you rinse the shampoo and soap from your hair and body.  - Using clean hands, apply about 2 ounces of soap gently on your skin from the neck to your toes. Use on your groin area last. Do not use this soap on your face or head. If you get any soap in your eyes, ears or mouth, rinse right away.  - Once the soap has been on your skin for at least 1 minute, rinse off completely and repeat washing with the surgical soap one more time.  - Rinse well and  dry off using a clean towel.  If you feel any tingling, itching or other irritation, rinse right away. It is normal to feel some coolness on the skin after using the antiseptic soap. Your skin may feel a bit dry after the shower, but do not use any lotions, creams or moisturizers. Do not use hair spray or other products in your hair.  - Dress in freshly washed clothes or pajamas. Use fresh pillowcases and sheets on your bed.    ? Day of Surgery  - You may drink clear liquids from midnight up to 2 hours before surgery or as directed by your surgeon.  Clear liquids include: Water, Pedialyte, Gatorade, apple juice and liquids you can read through. Please avoid liquids that are red or orange in color.   - Do NOT drink: milk, liquids that have pulp, orange juice, and lemonade or tomato juice.   - Do NOT chew gum, chew tobacco or suck on hard candy.    - Take another shower          Follow these instructions:      - Wash your hair and body with your regular shampoo and soap. Make sure you rinse the shampoo and soap from your hair and body.  - Using clean hands, apply about 2 ounces of soap gently on your skin from the neck to your toes. Use on your groin area last. Do not use this soap on your face or head. If you get any soap in your eyes, ears or mouth, rinse right away.  - Once the soap has been on your skin for at least 1 minute, rinse off completely and repeat washing with the surgical soap one more time.  - Rinse well and dry off using a clean towel.  If you feel any tingling, itching or other irritation, rinse right away. It is normal to feel some coolness on the skin after using the antiseptic soap. Your skin may feel a bit dry after the shower, but do not use any lotions, creams or moisturizers. Do not use hair spray or other products in your hair.  - Dress in freshly washed clothes.  - Do not wear deodorant, cologne, lotion, makeup, nail polish or jewelry to surgery.    ? If there is any change in your health  PRIOR to your surgery, please contact your surgeon's office.  Such as a fever, body aches, fatigue, any flu-like symptoms, rash, or any new injury to related body part.    ? Arrange for someone to drive you home after surgery.    will need to be a responsible adult (18 years or older) that will provide transportation to and from surgery and stay in the waiting room during your surgery. You may not drive yourself or take public transportation to and from surgery.    ? Arrange for someone to stay with you for 24 hours after you go home.   This person must be a responsible adult (18 years or older).    ? Bring these items to the hospital/surgery center:   ? Insurance card(s)  ? Money for parking and co-pays, if needed  ? A list of all the medications you take, including vitamins, minerals, herbs and over the counter medications.    ? A copy of your Advance Health Care Directive, if you have one.  This tells us what treatment(s) you would or would not want, and who would make health care decisions, if you could no longer speak for yourself.    ? A case for glasses, contact lenses, hearing aids or dentures.   ? Your inhaler or CPAP machine, if you use these at home    ? Leave extra cash, jewelry and other valuables at home.       ? Other information:   Sleep Apnea: Let your nurse know if you have a history of sleep apnea, only if you are having surgery at the [facility].    When you arrive for surgery  When you get to the surgery center/hospital, you will:  - Check in. If you are under age 18, you must be with a parent or legal guardian.  - Sign consent forms, if you haven t already. These forms state that you know the risks and benefits of surgery. When you sign the forms, you give us permission to do the surgery. Do not sign them unless you understand what will happen during and after your surgery. If you have any questions about your surgery, ask to speak with your doctor before you sign the  forms. If you don t understand the answers, ask again.  - Receive a copy of the Patient s Bill of Rights. If you do not receive a copy, please ask for one.  - Change into hospital clothes. Your belongings will be placed in a bag. We will return them to you after surgery.  - Meet with the anesthesia provider. He or she will tell you what kind of anesthesia (medicine) will be used to keep you comfortable during surgery.  - Remember: it s okay to remind doctors and nurses to wash their hands before touching you.  - In most cases, your surgeon will use a marker to write his or her initials on the surgery site. This ensures that the exact site is operated on.  - For safety reasons, we will ask you the same questions many times. For example, we may ask your name and birth date over and over again.  - Friends and family can stay with you until it s time for surgery. While you re in surgery, they will be in the waiting area. Please note that cell phones are not allowed in some patient care areas.  - If you have questions about what will happen in the operating room, talk to your care team.  - You will meet with an anesthesiologist, before your surgery.  He or she may reference types of anesthesia commonly used for surgeries:   o General:  This involves the use of an IV for injection of medication and anesthesia. You are put into a sleep and have a breathing tube to assist you with breathing.  o Sedation:  You are asleep, but not so deply that you need a breathing tube.   o Local or Regional: a nerve is injected to numb the surgical area.  o Spinal: you are numbed from the waist down with medicine injected into your back.  o Femoral Nerve Block:  Anesthesia injected into the groin of leg which you are having surgery on.      After surgery  We will move you to a recovery room, where we will watch you closely. If you have any pain or discomfort, tell your nurse. He or she will try to make you comfortable.    - If you are  staying overnight, we will move you to your hospital room after you are awake.  - If you are going home, we will move you to another room. Friends and family may be able to join you. The length of time you spend in recovery depend on the type of medicine you received, your medical condition, the type of surgery you had, or your response to the anesthesia given during your procedure.  - When you are discharged from the recovery room, the nurses will review instructions with you and your caregiver.  - Please wash your hands every time you touch the wound or change bandages or dressings.  - Do not submerge the wound in water for 2-3 weeks after surgery.  You may not use a bathtub or hot tub until the wound is closed. Any open area can be a source of incoming bacteria, so it is better to be on the safe side and avoid water submersion until your wound is fully healed.  Keep all dressings clean and dry.   - If you had surgery on your arm or leg, elevate it as much as possible to help reduce swelling.  - You may put ice on the surgical area for comfort, keeping ice on area for up to 20 minutes then off for 40 minutes.  You may do this the first few days after surgery to help reduce pain and swelling.   - Many surgical wounds will have small white strips of tape on them called steri-strips. These are to help support the incision and surrounding skin. Do not remove these. The edges will curl and fall off on their own, typically within 7-10 days with normal showering and hygiene.   - Drink at least 8-10 glasses of liquid each day to help your body heal.  - Keep your lungs clear by coughing and taking deep breaths every couple hours.  This is especially important the first 48 hours after surgery.    - Notify your doctor if you have any of the following:   o Fever of 101 F or higher  o Numbness and/or tingling  o Increased pain, redness or swelling  o Drainage from wound  o Prolonged or uncontrolled bleeding  o Difficulty with  movement    ? Physical Therapy  You will start physical therapy 3-5 days after surgery. Please set up an appointment before your surgery with a physical therapist of your choice. Many places are booked out 1-2 weeks, so be sure to reach out as soon as you can. If you have questions or are needing a physical therapy order faxed, please contact our clinic.     Follow-up Appointments, in Clinic  If you don't already have an appointment scheduled, please call to set up an appointment at [phone].      ? Post-Op appointments with provider (1 and 6 weeks post op)    Dealing with pain  A nurse will check your comfort level often during your stay. He or she will work with you to manage your pain.  It s expected that you will have pain after surgery.  Our goal is to reduce or minimize pain by:   - Medicine doesn t work the same for everyone. If your medicine isn t working, tell your nurse. There may be other medicines or treatments we can try.  - Medication Refills.  If you need refills on your pain medication, please call the clinic as soon as possible.  It may take 72-business hours to obtain a refill.  Refills must be picked up at check-in 2, [facility] or mailed to a pharmacy of your choice.    - It is expected that you will wean off the pain medications in a timely manner.   - Constipation is a common side effect of pain medication, decreased activity and anesthesia from surgery.  Take a stool softener as prescribed by your doctor at the time of discharge.  You may also use over the counter medications as needed.  Be sure to increase your fiber (fruits and vegetables) and your water intake.      Please call the clinic at [phone], if you experience any problems or have questions.  If you are having an emergency, always call [phone] or seek immediate evaluation at the Emergency Room.    Thank you for selecting [facility] for your  care!  ---------------------------------------------      Thanks for coming today.  Ortho[facility] Medicine Clinic  [address] [place] [address]    To schedule future appointments in Ortho Clinic, you may call [phone].    To schedule ordered imaging by your provider:   Call Central Imaging [phone]    To schedule an injection ordered by your provider:  Call Central Imaging Injection scheduling line: [phone]  [employer] available online at:  [email or web address]    Please call if any further questions or concerns ([phone]).  Clinic hours 8 am to 5 pm.    Return to clinic (call) if symptoms worsen or fail to improve.

## 2021-05-27 NOTE — LETTER
5/27/2021         RE: Ysabel Diggs  [address]  [place] [address]        Dear Colleague,    Thank you for referring your patient, Ysabel Diggs, to the [facility]. Please see a copy of my visit note below.    Pre-Operative Teaching Flowsheet     Person(s) involved in teaching: Patient     Motivation Level:  Receptive (willing/able to accept information) and asks appropriate questions where applicable: Yes  Any cultural factors/Druze beliefs that may influence understanding or compliance? No     Patient demonstrates understanding of the following:  Pre-operative planning, including the necessary appointments and preparation needed prior to surgery: Yes  Which situations necessitate calling provider and whom to contact: Yes  Pain management techniques pre and post op: Yes  How, and when, to access community resources: Yes         Additional Teaching Concerns Addressed:   Post-operative living arrangements and necessary adaptations to living environment.     Instructional Materials Used/Given: Yes, pre-op packet printed from [employer] with additional system forms added (COVID Testing Handout, Map, etc). Pre-op soap given (if in clinic).     Time spent with patient: 20 minutes.      CHIEF CONCERN: Right knee pain    HISTORY:   Very pleasant 40-year-old male sustained injury to his right knee while playing soccer approximately 10 years ago.  Feels that he tore his ACL at that time.  Has not been able to return to soccer.  He is ultimately gone through some lifestyle changes.  He is interested in pursuing ACL reconstructive surgery.  He does not feel confident in his knee.  It gives out on him.  It keeps him from doing the things that he wants to do.    PAST MEDICAL HISTORY: (Reviewed with the patient and in the [facility] medical record)  1. None    PAST SURGICAL HISTORY: (Reviewed with the patient and in the [facility] medical record)  1. None    MEDICATIONS: (Reviewed with the  patient and in the [facility] medical record)    Notable medications include: Omeprazole    ALLERGIES: (Reviewed with the patient and in the [facility] medical record)  1. No allergies      SOCIAL HISTORY: (Reviewed with the patient and in the medical record)  --Tobacco: History of smoking  --Occupation: He is currently doing delivery  --Avocation/Sport: Walking and biking though he has aspirations of returning to soccer    FAMILY HISTORY: (Reviewed with the patient and in the medical record)  -- No family history of bleeding, clotting, or difficulty with anesthesia    REVIEW OF SYSTEMS: (Reviewed with the patient and on the health intake form)  -- A comprehensive 10 point review of systems was conducted and is negative except as noted in the HPI    EXAM:     General: Awake, Alert and Oriented, No acute Distress. Articulate and Interactive    Body mass index is 25.09 kg/m .    Right lower extremity :    Skin is Warm and Well perfused, no suggestion of infection    2B Lachman, 1+ pivot shift    Stable to varus and valgus stress testing    Stable posterior drawer, 1+ anterior drawer    No medial or lateral joint line tenderness    Range of motion 0 to 135 degrees    EHL/FHL/TA/GS 5/5    Sensation intact L3-S1    2+ Dorsalis Pedis Pulse    IMAGING:    Plain Radiographs: Some changes noted in the lateral compartment overall joint spaces preserved though I do think there is joint space narrowing in the right knee as compared to the left.    MRI: Chronic ACL rupture right knee, complex tear of the posterior horn of the lateral meniscus right knee some patellofemoral arthritis is noted    ASSESSMENT:  1. Chronic ACL deficient right knee  2. Lateral compartment chondrosis/arthritis  3. Some patellofemoral chondrosis    PLAN:  1. I had a long discussion with the patient regarding his right knee.  He is a very complex situation with a long standing chronically deficient ACL.  I do think he is a candidate for ACL reconstruction.  2. I  offered him examination under anesthesia right knee, right knee arthroscopy, ACL reconstruction hamstring autograft, meniscus surgery  3. He understands that the surgery will not necessarily change his long-term risk of osteoarthritis and it certainly will not reverse the amount of arthritis that is already occurred.  4. He may have continued symptoms from the arthritis in this the case we may need to consider intervention.  We will use the time of surgery now to define a plan for the future.  This could include things such as osteochondral allograft transplantation or distal femoral osteotomy  5. I discussed with him the risk benefits complication techniques alternatives to surgery as well as the limitations of surgery.  He is desired to proceed.          Again, thank you for allowing me to participate in the care of your patient.        Sincerely,        Devyn Beard MD

## 2021-05-27 NOTE — PROGRESS NOTES
Pre-Operative Teaching Flowsheet     Person(s) involved in teaching: Patient     Motivation Level:  Receptive (willing/able to accept information) and asks appropriate questions where applicable: Yes  Any cultural factors/Sikh beliefs that may influence understanding or compliance? No     Patient demonstrates understanding of the following:  Pre-operative planning, including the necessary appointments and preparation needed prior to surgery: Yes  Which situations necessitate calling provider and whom to contact: Yes  Pain management techniques pre and post op: Yes  How, and when, to access community resources: Yes         Additional Teaching Concerns Addressed:   Post-operative living arrangements and necessary adaptations to living environment.     Instructional Materials Used/Given: Yes, pre-op packet printed from [employer] with additional system forms added (COVID Testing Handout, Map, etc). Pre-op soap given (if in clinic).     Time spent with patient: 20 minutes.

## 2021-05-27 NOTE — TELEPHONE ENCOUNTER
Procedure: ACL recon with hamstring autograft  Facility: [facility]  Length: 90 minutes  Anesthesia: Choice  Post-op appointments needed: 2 weeks provider only, 6 weeks with provider only.  Surgery packet/instructions given to patient?  Yes     Radha Hyman RN

## 2021-05-27 NOTE — PROGRESS NOTES
CHIEF CONCERN: Right knee pain    HISTORY:   Very pleasant 40-year-old male sustained injury to his right knee while playing soccer approximately 10 years ago.  Feels that he tore his ACL at that time.  Has not been able to return to soccer.  He is ultimately gone through some lifestyle changes.  He is interested in pursuing ACL reconstructive surgery.  He does not feel confident in his knee.  It gives out on him.  It keeps him from doing the things that he wants to do.    PAST MEDICAL HISTORY: (Reviewed with the patient and in the [facility] medical record)  1. None    PAST SURGICAL HISTORY: (Reviewed with the patient and in the [facility] medical record)  1. None    MEDICATIONS: (Reviewed with the patient and in the [facility] medical record)    Notable medications include: Omeprazole    ALLERGIES: (Reviewed with the patient and in the [facility] medical record)  1. No allergies      SOCIAL HISTORY: (Reviewed with the patient and in the medical record)  --Tobacco: History of smoking  --Occupation: He is currently doing delivery  --Avocation/Sport: Walking and biking though he has aspirations of returning to soccer    FAMILY HISTORY: (Reviewed with the patient and in the medical record)  -- No family history of bleeding, clotting, or difficulty with anesthesia    REVIEW OF SYSTEMS: (Reviewed with the patient and on the health intake form)  -- A comprehensive 10 point review of systems was conducted and is negative except as noted in the HPI    EXAM:     General: Awake, Alert and Oriented, No acute Distress. Articulate and Interactive    Body mass index is 25.09 kg/m .    Right lower extremity :    Skin is Warm and Well perfused, no suggestion of infection    2B Lachman, 1+ pivot shift    Stable to varus and valgus stress testing    Stable posterior drawer, 1+ anterior drawer    No medial or lateral joint line tenderness    Range of motion 0 to 135 degrees    EHL/FHL/TA/GS 5/5    Sensation intact L3-S1    2+ Dorsalis Pedis  Pulse    IMAGING:    Plain Radiographs: Some changes noted in the lateral compartment overall joint spaces preserved though I do think there is joint space narrowing in the right knee as compared to the left.    MRI: Chronic ACL rupture right knee, complex tear of the posterior horn of the lateral meniscus right knee some patellofemoral arthritis is noted    ASSESSMENT:  1. Chronic ACL deficient right knee  2. Lateral compartment chondrosis/arthritis  3. Some patellofemoral chondrosis    PLAN:  1. I had a long discussion with the patient regarding his right knee.  He is a very complex situation with a long standing chronically deficient ACL.  I do think he is a candidate for ACL reconstruction.  2. I offered him examination under anesthesia right knee, right knee arthroscopy, ACL reconstruction hamstring autograft, meniscus surgery  3. He understands that the surgery will not necessarily change his long-term risk of osteoarthritis and it certainly will not reverse the amount of arthritis that is already occurred.  4. He may have continued symptoms from the arthritis in this the case we may need to consider intervention.  We will use the time of surgery now to define a plan for the future.  This could include things such as osteochondral allograft transplantation or distal femoral osteotomy  5. I discussed with him the risk benefits complication techniques alternatives to surgery as well as the limitations of surgery.  He is desired to proceed.

## 2021-05-27 NOTE — NURSING NOTE
"Ysabel Diggs's chief complaint for this visit includes:  Chief Complaint   Patient presents with     Knee Pain     Right knee, history of injury about 10 years ago     PCP: No Ref-Primary, Physician    Referring Provider:  Drew Escobar MD  [address] [facility]  [place] [address]    BP (!) 136/92   Pulse 85   Ht 1.727 m (5' 8\")   Wt 74.8 kg (165 lb)   BMI 25.09 kg/m    Worst Pain (10)     Do you need any medication refills at today's visit?    "

## 2021-05-28 NOTE — TELEPHONE ENCOUNTER
Date Scheduled: 6-25-21  Facility: [facility]  Surgeon: Dr. Beard   Post-op appointment scheduled:   Next 5 appointments (look out 90 days)    Jun 22, 2021  2:00 PM  Pre-procedure Covid with [facility]  [facility] ([facility] ) [address] [address]  [phone]   Jul 01, 2021 11:45 AM  Return Visit with Devyn Beard MD  [facility] ([facility]) [id number] [place] [id number] [phone]           scheduled?: No  Surgery packet/instructions confirmed received?  Yes  Special Considerations:   Merissa Lee, Surgery Scheduling Coordinator

## 2021-05-29 ENCOUNTER — RECORDS - HEALTHEAST (OUTPATIENT)
Dept: ADMINISTRATIVE | Facility: CLINIC | Age: 41
End: 2021-05-29

## 2021-05-31 DIAGNOSIS — Z11.59 ENCOUNTER FOR SCREENING FOR OTHER VIRAL DISEASES: ICD-10-CM

## 2021-06-01 VITALS — BODY MASS INDEX: 24.43 KG/M2 | HEIGHT: 66 IN | WEIGHT: 152 LBS

## 2021-06-15 ENCOUNTER — OFFICE VISIT (OUTPATIENT)
Dept: FAMILY MEDICINE | Facility: CLINIC | Age: 41
End: 2021-06-15
Payer: COMMERCIAL

## 2021-06-15 VITALS
WEIGHT: 165.05 LBS | SYSTOLIC BLOOD PRESSURE: 122 MMHG | BODY MASS INDEX: 25.1 KG/M2 | OXYGEN SATURATION: 99 % | DIASTOLIC BLOOD PRESSURE: 84 MMHG | HEART RATE: 83 BPM | TEMPERATURE: 98.3 F

## 2021-06-15 DIAGNOSIS — M17.31 POST-TRAUMATIC OSTEOARTHRITIS OF RIGHT KNEE: ICD-10-CM

## 2021-06-15 DIAGNOSIS — Z01.818 PREOP GENERAL PHYSICAL EXAM: Primary | ICD-10-CM

## 2021-06-15 DIAGNOSIS — S83.511A CHRONIC RUPTURE OF ANTERIOR CRUCIATE LIGAMENT OF RIGHT KNEE: ICD-10-CM

## 2021-06-15 PROCEDURE — 99214 OFFICE O/P EST MOD 30 MIN: CPT | Performed by: PHYSICIAN ASSISTANT

## 2021-06-15 ASSESSMENT — PAIN SCALES - GENERAL: PAINLEVEL: EXTREME PAIN (8)

## 2021-06-15 NOTE — H&P (VIEW-ONLY)
[facility]  [address]  [place] [address]  Phone: [phone]  Primary Provider: No Ref-Primary, Physician  Pre-op Performing Provider: SERA BHAT      PREOPERATIVE EVALUATION:  Today's date: 6/15/2021    Ysabel Diggs is a 40 year old male who presents for a preoperative evaluation.    Surgical Information:  Surgery/Procedure: right knee ACL reconstruction hamstring autograft, meniscus surgery  Surgery Location: [facility]  Surgeon: Dr. Beard  Surgery Date: 6/25/21  Time of Surgery: 1:30  Where patient plans to recover: At home with family  Fax number for surgical facility: Note does not need to be faxed, will be available electronically in Epic.    Type of Anesthesia Anticipated: to be determined    Assessment & Plan     The proposed surgical procedure is considered INTERMEDIATE risk.    Preop general physical exam  Post-traumatic osteoarthritis of right knee  Chronic rupture of anterior cruciate ligament of right knee      Risks and Recommendations:  The patient has the following additional risks and recommendations for perioperative complications:   - No identified additional risk factors other than previously addressed    Medication Instructions:  Patient is to take all scheduled medications on the day of surgery    RECOMMENDATION:  APPROVAL GIVEN to proceed with proposed procedure, without further diagnostic evaluation.        Subjective     HPI related to upcoming procedure: ACL tear a number of years ago. Persistent pain and giving out episodes - feels unstable. Will undergo reconstruction.    Preop Questions 6/14/2021   1. Have you ever had a heart attack or stroke? No   2. Have you ever had surgery on your heart or blood vessels, such as a stent placement, a coronary artery bypass, or surgery on an artery in your head, neck, heart, or legs? No   3. Do you have chest pain with activity? No   4. Do you have a history of  heart failure? No    5. Do you currently have a cold, bronchitis or symptoms of other infection? No   6. Do you have a cough, shortness of breath, or wheezing? No   7. Do you or anyone in your family have previous history of blood clots? No   8. Do you or does anyone in your family have a serious bleeding problem such as prolonged bleeding following surgeries or cuts? No   9. Have you ever had problems with anemia or been told to take iron pills? No   10. Have you had any abnormal blood loss such as black, tarry or bloody stools? No   11. Have you ever had a blood transfusion? No   12. Are you willing to have a blood transfusion if it is medically needed before, during, or after your surgery? Yes   13. Have you or any of your relatives ever had problems with anesthesia? No   14. Do you have sleep apnea, excessive snoring or daytime drowsiness? No   15. Do you have any artifical heart valves or other implanted medical devices like a pacemaker, defibrillator, or continuous glucose monitor? No   16. Do you have artificial joints? No   17. Are you allergic to latex? No       Health Care Directive:  Patient does not have a Health Care Directive or Living Will: Discussed advance care planning with patient; however, patient declined at this time.    Preoperative Review of :   reviewed - no record of controlled substances prescribed.      Review of Systems  CONSTITUTIONAL: NEGATIVE for fever, chills, change in weight  INTEGUMENTARY/SKIN: NEGATIVE for worrisome rashes, moles or lesions  EYES: NEGATIVE for vision changes or irritation  ENT/MOUTH: NEGATIVE for ear, mouth and throat problems  RESP: NEGATIVE for significant cough or SOB  BREAST: NEGATIVE for masses, tenderness or discharge  CV: NEGATIVE for chest pain, palpitations or peripheral edema  GI: NEGATIVE for nausea, abdominal pain, heartburn, or change in bowel habits  : NEGATIVE for frequency, dysuria, or hematuria  MUSCULOSKELETAL: NEGATIVE for significant arthralgias or  myalgia  NEURO: NEGATIVE for weakness, dizziness or paresthesias  ENDOCRINE: NEGATIVE for temperature intolerance, skin/hair changes  HEME: NEGATIVE for bleeding problems  PSYCHIATRIC: NEGATIVE for changes in mood or affect    Patient Active Problem List    Diagnosis Date Noted     Post-traumatic osteoarthritis of right knee 05/27/2021     Priority: Medium     Added automatically from request for surgery [id number]       CARDIOVASCULAR SCREENING; LDL GOAL LESS THAN 160 10/31/2010     Priority: Medium     H. pylori infection      Priority: Medium     Smoking      Priority: Medium      Past Medical History:   Diagnosis Date     Esophagitis June 2010    LA Grade C reflux esophagitis on endoscopy     Gastric ulcer, unspecified as acute or chronic, without mention of hemorrhage or perforation June 2010    on Upper endo     H. pylori infection March 2010     Plantar warts      Smoking      Past Surgical History:   Procedure Laterality Date     C APPENDECTOMY  2005     UPPER GI ENDOSCOPY  June 2010    Esophagitis + stomach ulcer     Current Outpatient Medications   Medication Sig Dispense Refill     ibuprofen (ADVIL/MOTRIN) 600 MG tablet Take 1 tablet (600 mg) by mouth every 6 hours as needed for moderate pain 90 tablet 0     OMEPRAZOLE PO        sucralfate (CARAFATE) 1 GM tablet Take 1 tablet (1 g) by mouth 4 times daily 28 tablet 0       No Known Allergies     Social History     Tobacco Use     Smoking status: Current Some Day Smoker     Packs/day: 0.50     Types: Cigarettes     Smokeless tobacco: Never Used     Tobacco comment: 10-15 cig a day   Substance Use Topics     Alcohol use: Yes     Comment: Ocass.       History   Drug Use No         Objective     /84   Pulse 83   Temp 98.3  F (36.8  C) (Oral)   Wt 74.9 kg (165 lb 0.8 oz)   SpO2 99%   BMI 25.10 kg/m      Physical Exam    GENERAL APPEARANCE: healthy, alert and no distress     EYES: EOMI,  PERRL     HENT: ear canals and TM's normal and nose and mouth  without ulcers or lesions     NECK: no adenopathy, no asymmetry, masses, or scars and thyroid normal to palpation     RESP: lungs clear to auscultation - no rales, rhonchi or wheezes     CV: regular rates and rhythm, normal S1 S2, no S3 or S4 and no murmur, click or rub     ABDOMEN:  soft, nontender, no HSM or masses and bowel sounds normal     MS: extremities normal- no gross deformities noted, no evidence of inflammation in joints, FROM in all extremities.     SKIN: no suspicious lesions or rashes     NEURO: Normal strength and tone, sensory exam grossly normal, mentation intact and speech normal     PSYCH: mentation appears normal. and affect normal/bright     LYMPHATICS: No cervical adenopathy    Recent Labs   Lab Test 04/08/21  2259   HGB 15.2         POTASSIUM 3.6   CR 0.72        Diagnostics:  No labs were ordered during this visit.   No EKG required, no history of coronary heart disease, significant arrhythmia, peripheral arterial disease or other structural heart disease.    Revised Cardiac Risk Index (RCRI):  The patient has the following serious cardiovascular risks for perioperative complications:   - No serious cardiac risks = 0 points     RCRI Interpretation: 0 points: Class I (very low risk - 0.4% complication rate)           Signed Electronically by: Chinyere Herron PA-C  Copy of this evaluation report is provided to requesting physician.

## 2021-06-15 NOTE — PROGRESS NOTES
[facility]  [address]  [place] [address]  Phone: [phone]  Primary Provider: No Ref-Primary, Physician  Pre-op Performing Provider: SERA BHAT      PREOPERATIVE EVALUATION:  Today's date: 6/15/2021    Ysabel Diggs is a 40 year old male who presents for a preoperative evaluation.    Surgical Information:  Surgery/Procedure: right knee ACL reconstruction hamstring autograft, meniscus surgery  Surgery Location: [facility]  Surgeon: Dr. Beard  Surgery Date: 6/25/21  Time of Surgery: 1:30  Where patient plans to recover: At home with family  Fax number for surgical facility: Note does not need to be faxed, will be available electronically in Epic.    Type of Anesthesia Anticipated: to be determined    Assessment & Plan     The proposed surgical procedure is considered INTERMEDIATE risk.    Preop general physical exam  Post-traumatic osteoarthritis of right knee  Chronic rupture of anterior cruciate ligament of right knee      Risks and Recommendations:  The patient has the following additional risks and recommendations for perioperative complications:   - No identified additional risk factors other than previously addressed    Medication Instructions:  Patient is to take all scheduled medications on the day of surgery    RECOMMENDATION:  APPROVAL GIVEN to proceed with proposed procedure, without further diagnostic evaluation.        Subjective     HPI related to upcoming procedure: ACL tear a number of years ago. Persistent pain and giving out episodes - feels unstable. Will undergo reconstruction.    Preop Questions 6/14/2021   1. Have you ever had a heart attack or stroke? No   2. Have you ever had surgery on your heart or blood vessels, such as a stent placement, a coronary artery bypass, or surgery on an artery in your head, neck, heart, or legs? No   3. Do you have chest pain with activity? No   4. Do you have a history of  heart failure? No    5. Do you currently have a cold, bronchitis or symptoms of other infection? No   6. Do you have a cough, shortness of breath, or wheezing? No   7. Do you or anyone in your family have previous history of blood clots? No   8. Do you or does anyone in your family have a serious bleeding problem such as prolonged bleeding following surgeries or cuts? No   9. Have you ever had problems with anemia or been told to take iron pills? No   10. Have you had any abnormal blood loss such as black, tarry or bloody stools? No   11. Have you ever had a blood transfusion? No   12. Are you willing to have a blood transfusion if it is medically needed before, during, or after your surgery? Yes   13. Have you or any of your relatives ever had problems with anesthesia? No   14. Do you have sleep apnea, excessive snoring or daytime drowsiness? No   15. Do you have any artifical heart valves or other implanted medical devices like a pacemaker, defibrillator, or continuous glucose monitor? No   16. Do you have artificial joints? No   17. Are you allergic to latex? No       Health Care Directive:  Patient does not have a Health Care Directive or Living Will: Discussed advance care planning with patient; however, patient declined at this time.    Preoperative Review of :   reviewed - no record of controlled substances prescribed.      Review of Systems  CONSTITUTIONAL: NEGATIVE for fever, chills, change in weight  INTEGUMENTARY/SKIN: NEGATIVE for worrisome rashes, moles or lesions  EYES: NEGATIVE for vision changes or irritation  ENT/MOUTH: NEGATIVE for ear, mouth and throat problems  RESP: NEGATIVE for significant cough or SOB  BREAST: NEGATIVE for masses, tenderness or discharge  CV: NEGATIVE for chest pain, palpitations or peripheral edema  GI: NEGATIVE for nausea, abdominal pain, heartburn, or change in bowel habits  : NEGATIVE for frequency, dysuria, or hematuria  MUSCULOSKELETAL: NEGATIVE for significant arthralgias or  myalgia  NEURO: NEGATIVE for weakness, dizziness or paresthesias  ENDOCRINE: NEGATIVE for temperature intolerance, skin/hair changes  HEME: NEGATIVE for bleeding problems  PSYCHIATRIC: NEGATIVE for changes in mood or affect    Patient Active Problem List    Diagnosis Date Noted     Post-traumatic osteoarthritis of right knee 05/27/2021     Priority: Medium     Added automatically from request for surgery [id number]       CARDIOVASCULAR SCREENING; LDL GOAL LESS THAN 160 10/31/2010     Priority: Medium     H. pylori infection      Priority: Medium     Smoking      Priority: Medium      Past Medical History:   Diagnosis Date     Esophagitis June 2010    LA Grade C reflux esophagitis on endoscopy     Gastric ulcer, unspecified as acute or chronic, without mention of hemorrhage or perforation June 2010    on Upper endo     H. pylori infection March 2010     Plantar warts      Smoking      Past Surgical History:   Procedure Laterality Date     C APPENDECTOMY  2005     UPPER GI ENDOSCOPY  June 2010    Esophagitis + stomach ulcer     Current Outpatient Medications   Medication Sig Dispense Refill     ibuprofen (ADVIL/MOTRIN) 600 MG tablet Take 1 tablet (600 mg) by mouth every 6 hours as needed for moderate pain 90 tablet 0     OMEPRAZOLE PO        sucralfate (CARAFATE) 1 GM tablet Take 1 tablet (1 g) by mouth 4 times daily 28 tablet 0       No Known Allergies     Social History     Tobacco Use     Smoking status: Current Some Day Smoker     Packs/day: 0.50     Types: Cigarettes     Smokeless tobacco: Never Used     Tobacco comment: 10-15 cig a day   Substance Use Topics     Alcohol use: Yes     Comment: Ocass.       History   Drug Use No         Objective     /84   Pulse 83   Temp 98.3  F (36.8  C) (Oral)   Wt 74.9 kg (165 lb 0.8 oz)   SpO2 99%   BMI 25.10 kg/m      Physical Exam    GENERAL APPEARANCE: healthy, alert and no distress     EYES: EOMI,  PERRL     HENT: ear canals and TM's normal and nose and mouth  without ulcers or lesions     NECK: no adenopathy, no asymmetry, masses, or scars and thyroid normal to palpation     RESP: lungs clear to auscultation - no rales, rhonchi or wheezes     CV: regular rates and rhythm, normal S1 S2, no S3 or S4 and no murmur, click or rub     ABDOMEN:  soft, nontender, no HSM or masses and bowel sounds normal     MS: extremities normal- no gross deformities noted, no evidence of inflammation in joints, FROM in all extremities.     SKIN: no suspicious lesions or rashes     NEURO: Normal strength and tone, sensory exam grossly normal, mentation intact and speech normal     PSYCH: mentation appears normal. and affect normal/bright     LYMPHATICS: No cervical adenopathy    Recent Labs   Lab Test 04/08/21  2259   HGB 15.2         POTASSIUM 3.6   CR 0.72        Diagnostics:  No labs were ordered during this visit.   No EKG required, no history of coronary heart disease, significant arrhythmia, peripheral arterial disease or other structural heart disease.    Revised Cardiac Risk Index (RCRI):  The patient has the following serious cardiovascular risks for perioperative complications:   - No serious cardiac risks = 0 points     RCRI Interpretation: 0 points: Class I (very low risk - 0.4% complication rate)           Signed Electronically by: Chinyere Herron PA-C  Copy of this evaluation report is provided to requesting physician.

## 2021-06-15 NOTE — PATIENT INSTRUCTIONS

## 2021-06-19 NOTE — PROGRESS NOTES
"Assessment / Impression     1. Penile pain  Ambulatory referral to Urology    CANCELED: Ambulatory referral to Urology   2. Pain with ejaculation  Ambulatory referral to Urology    CANCELED: Ambulatory referral to Urology   3. Dysuria  Urinalysis-UC if Indicated    Chlamydia trachomatis & Neisseria gonorrhoeae, Amplified Detection    Ambulatory referral to Urology    CANCELED: Ambulatory referral to Urology   4. Microscopic hematuria  Ambulatory referral to Urology   5. Hypospadias           Plan:     We checked a urinalysis which showed a small amount of blood, but no signs of infection.  His symptoms appear to be due to the injury he sustained to his penis during intercourse this past February.  I do not detect scar tissue in his symptoms do not sound consistent with Shanda's disease.  I recommended he see urology for further workup and evaluation.    Subjective:      HPI: Ysabel Diggs is a 37 y.o. male who presents to the clinic complaining of penile pain that he has had since last February.  This started after he injured it during intercourse.  He states that it felt like his penis hit a bone.  He noticed blood in his ejaculate after that injury.  He is concerned because he continues to have pain along the left side of his penis.  He continues to have pain with intercourse and ejaculation.  Occasionally he notices dysuria symptoms.  He denies hematuria.  He reports that his erections seem straight to normal.  He was seen to [place] the symptoms on 4/26/18.  His urinalysis was negative and laboratory workup was unremarkable.        Review of Systems  All other systems reviewed and are negative.     History   Smoking Status     Current Every Day Smoker   Smokeless Tobacco     Never Used       No family history on file.    Objective:     /64 (Patient Site: Left Arm)  Pulse 60  Temp 98.6  F (37  C) (Oral)   Resp 12  Ht 5' 5.75\" (1.67 m)  Wt 152 lb (68.9 kg)  BMI 24.72 kg/m2  Physical Examination: " General appearance - alert, well appearing, and in no distress  Eyes: pupils equal and reactive, extraocular eye movements intact  Mouth: mucous membranes moist, pharynx normal without lesions  Neck: supple, no significant adenopathy  Lungs: clear to auscultation, no wheezes, rales or rhonchi, symmetric air entry  Heart: normal rate, regular rhythm, normal S1, S2, no murmurs, rubs, clicks or gallops  Abdomen: soft, nontender, nondistended, no masses or organomegaly  Back: No CVA tenderness  Neurological: alert, oriented, normal speech, no focal findings or movement disorder noted.    Extremities: No edema, no clubbing or cyanosis  : Circumcised.  There is mild hypospadias.  No scrotal masses or tenderness.  He is mildly tender over the left side of the shaft of his penis without scar tissue.  Psychiatric: Normal affect. Does not appear anxious or depressed.    Recent Results (from the past 168 hour(s))   Urinalysis-UC if Indicated   Result Value Ref Range    Color, UA Yellow Colorless, Yellow, Straw, Light Yellow    Clarity, UA Clear Clear    Glucose, UA Negative Negative    Bilirubin, UA Negative Negative    Ketones, UA Negative Negative    Specific Gravity, UA 1.020 1.005 - 1.030    Blood, UA Small (!) Negative    pH, UA 6.0 5.0 - 8.0    Protein, UA Negative Negative mg/dL    Urobilinogen, UA 0.2 E.U./dL 0.2 E.U./dL, 1.0 E.U./dL    Nitrite, UA Negative Negative    Leukocytes, UA Negative Negative    Bacteria, UA Few (!) None Seen hpf    RBC, UA 0-2 None Seen, 0-2 hpf    WBC, UA 0-5 None Seen, 0-5 hpf    Squam Epithel, UA 0-5 None Seen, 0-5 lpf    Mucus, UA Few (!) None Seen lpf       No current outpatient prescriptions on file.

## 2021-06-22 DIAGNOSIS — Z11.59 ENCOUNTER FOR SCREENING FOR OTHER VIRAL DISEASES: ICD-10-CM

## 2021-06-22 LAB
SARS-COV-2 RNA RESP QL NAA+PROBE: NORMAL
SPECIMEN SOURCE: NORMAL

## 2021-06-22 PROCEDURE — U0003 INFECTIOUS AGENT DETECTION BY NUCLEIC ACID (DNA OR RNA); SEVERE ACUTE RESPIRATORY SYNDROME CORONAVIRUS 2 (SARS-COV-2) (CORONAVIRUS DISEASE [COVID-19]), AMPLIFIED PROBE TECHNIQUE, MAKING USE OF HIGH THROUGHPUT TECHNOLOGIES AS DESCRIBED BY CMS-2020-01-R: HCPCS | Performed by: ORTHOPAEDIC SURGERY

## 2021-06-22 PROCEDURE — U0005 INFEC AGEN DETEC AMPLI PROBE: HCPCS | Performed by: ORTHOPAEDIC SURGERY

## 2021-06-23 LAB
LABORATORY COMMENT REPORT: NORMAL
SARS-COV-2 RNA RESP QL NAA+PROBE: NEGATIVE
SPECIMEN SOURCE: NORMAL

## 2021-06-24 ENCOUNTER — ANESTHESIA EVENT (OUTPATIENT)
Dept: SURGERY | Facility: AMBULATORY SURGERY CENTER | Age: 41
End: 2021-06-24
Payer: COMMERCIAL

## 2021-06-25 ENCOUNTER — HOSPITAL ENCOUNTER (OUTPATIENT)
Facility: AMBULATORY SURGERY CENTER | Age: 41
End: 2021-06-25
Attending: ORTHOPAEDIC SURGERY
Payer: COMMERCIAL

## 2021-06-25 ENCOUNTER — ANESTHESIA (OUTPATIENT)
Dept: SURGERY | Facility: AMBULATORY SURGERY CENTER | Age: 41
End: 2021-06-25
Payer: COMMERCIAL

## 2021-06-25 VITALS
HEIGHT: 68 IN | SYSTOLIC BLOOD PRESSURE: 135 MMHG | DIASTOLIC BLOOD PRESSURE: 96 MMHG | HEART RATE: 73 BPM | BODY MASS INDEX: 24.86 KG/M2 | RESPIRATION RATE: 12 BRPM | OXYGEN SATURATION: 97 % | WEIGHT: 164 LBS | TEMPERATURE: 96.6 F

## 2021-06-25 DIAGNOSIS — M17.31 POST-TRAUMATIC OSTEOARTHRITIS OF RIGHT KNEE: Primary | ICD-10-CM

## 2021-06-25 PROCEDURE — 29888 ARTHRS AID ACL RPR/AGMNTJ: CPT | Mod: RT

## 2021-06-25 PROCEDURE — 29881 ARTHRS KNE SRG MNISECTMY M/L: CPT | Mod: RT

## 2021-06-25 PROCEDURE — 29882 ARTHRS KNE SRG MNISC RPR M/L: CPT | Mod: RT

## 2021-06-25 DEVICE — IMP ANCHOR ARTHREX ABS TIGHT ROPE IMP & BUTTON AR-1588TN: Type: IMPLANTABLE DEVICE | Site: KNEE | Status: FUNCTIONAL

## 2021-06-25 DEVICE — IMP BUTTON ARTHREX ABS TIGHT ROPE 11MM BUTTON AR-1588TB-3: Type: IMPLANTABLE DEVICE | Site: KNEE | Status: FUNCTIONAL

## 2021-06-25 DEVICE — IMP ANCHOR ARTHREX ACL TIGHT ROPE REPAIR AR-1588RT: Type: IMPLANTABLE DEVICE | Site: KNEE | Status: FUNCTIONAL

## 2021-06-25 RX ORDER — ONDANSETRON 4 MG/1
4 TABLET, ORALLY DISINTEGRATING ORAL EVERY 30 MIN PRN
Status: DISCONTINUED | OUTPATIENT
Start: 2021-06-25 | End: 2021-06-26 | Stop reason: HOSPADM

## 2021-06-25 RX ORDER — ONDANSETRON 4 MG/1
4 TABLET, ORALLY DISINTEGRATING ORAL
Status: DISCONTINUED | OUTPATIENT
Start: 2021-06-25 | End: 2021-06-26 | Stop reason: HOSPADM

## 2021-06-25 RX ORDER — NALOXONE HYDROCHLORIDE 0.4 MG/ML
0.4 INJECTION, SOLUTION INTRAMUSCULAR; INTRAVENOUS; SUBCUTANEOUS
Status: DISCONTINUED | OUTPATIENT
Start: 2021-06-25 | End: 2021-06-26 | Stop reason: HOSPADM

## 2021-06-25 RX ORDER — FENTANYL CITRATE 50 UG/ML
INJECTION, SOLUTION INTRAMUSCULAR; INTRAVENOUS PRN
Status: DISCONTINUED | OUTPATIENT
Start: 2021-06-25 | End: 2021-06-25

## 2021-06-25 RX ORDER — SODIUM CHLORIDE, SODIUM LACTATE, POTASSIUM CHLORIDE, CALCIUM CHLORIDE 600; 310; 30; 20 MG/100ML; MG/100ML; MG/100ML; MG/100ML
INJECTION, SOLUTION INTRAVENOUS CONTINUOUS
Status: DISCONTINUED | OUTPATIENT
Start: 2021-06-25 | End: 2021-06-25 | Stop reason: HOSPADM

## 2021-06-25 RX ORDER — SODIUM CHLORIDE, SODIUM LACTATE, POTASSIUM CHLORIDE, CALCIUM CHLORIDE 600; 310; 30; 20 MG/100ML; MG/100ML; MG/100ML; MG/100ML
INJECTION, SOLUTION INTRAVENOUS CONTINUOUS
Status: DISCONTINUED | OUTPATIENT
Start: 2021-06-25 | End: 2021-06-26 | Stop reason: HOSPADM

## 2021-06-25 RX ORDER — ONDANSETRON 2 MG/ML
INJECTION INTRAMUSCULAR; INTRAVENOUS PRN
Status: DISCONTINUED | OUTPATIENT
Start: 2021-06-25 | End: 2021-06-25

## 2021-06-25 RX ORDER — HYDROXYZINE HYDROCHLORIDE 25 MG/1
25 TABLET, FILM COATED ORAL
Status: DISCONTINUED | OUTPATIENT
Start: 2021-06-25 | End: 2021-06-26 | Stop reason: HOSPADM

## 2021-06-25 RX ORDER — NALOXONE HYDROCHLORIDE 0.4 MG/ML
0.2 INJECTION, SOLUTION INTRAMUSCULAR; INTRAVENOUS; SUBCUTANEOUS
Status: DISCONTINUED | OUTPATIENT
Start: 2021-06-25 | End: 2021-06-25 | Stop reason: HOSPADM

## 2021-06-25 RX ORDER — PROPOFOL 10 MG/ML
INJECTION, EMULSION INTRAVENOUS CONTINUOUS PRN
Status: DISCONTINUED | OUTPATIENT
Start: 2021-06-25 | End: 2021-06-25

## 2021-06-25 RX ORDER — ONDANSETRON 2 MG/ML
4 INJECTION INTRAMUSCULAR; INTRAVENOUS EVERY 30 MIN PRN
Status: DISCONTINUED | OUTPATIENT
Start: 2021-06-25 | End: 2021-06-26 | Stop reason: HOSPADM

## 2021-06-25 RX ORDER — OXYCODONE HYDROCHLORIDE 5 MG/1
5 TABLET ORAL
Status: COMPLETED | OUTPATIENT
Start: 2021-06-25 | End: 2021-06-25

## 2021-06-25 RX ORDER — OXYCODONE HYDROCHLORIDE 5 MG/1
5-10 TABLET ORAL EVERY 4 HOURS PRN
Qty: 20 TABLET | Refills: 0 | Status: SHIPPED | OUTPATIENT
Start: 2021-06-25 | End: 2021-06-29

## 2021-06-25 RX ORDER — PROPOFOL 10 MG/ML
INJECTION, EMULSION INTRAVENOUS PRN
Status: DISCONTINUED | OUTPATIENT
Start: 2021-06-25 | End: 2021-06-25

## 2021-06-25 RX ORDER — ACETAMINOPHEN 325 MG/1
975 TABLET ORAL ONCE
Status: COMPLETED | OUTPATIENT
Start: 2021-06-25 | End: 2021-06-25

## 2021-06-25 RX ORDER — KETOROLAC TROMETHAMINE 30 MG/ML
30 INJECTION, SOLUTION INTRAMUSCULAR; INTRAVENOUS EVERY 6 HOURS PRN
Status: DISCONTINUED | OUTPATIENT
Start: 2021-06-25 | End: 2021-06-26 | Stop reason: HOSPADM

## 2021-06-25 RX ORDER — FLUMAZENIL 0.1 MG/ML
0.2 INJECTION, SOLUTION INTRAVENOUS
Status: DISCONTINUED | OUTPATIENT
Start: 2021-06-25 | End: 2021-06-25 | Stop reason: HOSPADM

## 2021-06-25 RX ORDER — BUPIVACAINE HYDROCHLORIDE AND EPINEPHRINE 2.5; 5 MG/ML; UG/ML
INJECTION, SOLUTION INFILTRATION; PERINEURAL PRN
Status: DISCONTINUED | OUTPATIENT
Start: 2021-06-25 | End: 2021-06-25 | Stop reason: HOSPADM

## 2021-06-25 RX ORDER — OXYCODONE HYDROCHLORIDE 5 MG/1
5 TABLET ORAL EVERY 4 HOURS PRN
Status: DISCONTINUED | OUTPATIENT
Start: 2021-06-25 | End: 2021-06-26 | Stop reason: HOSPADM

## 2021-06-25 RX ORDER — ONDANSETRON 4 MG/1
4-8 TABLET, ORALLY DISINTEGRATING ORAL EVERY 8 HOURS PRN
Qty: 4 TABLET | Refills: 0 | Status: SHIPPED | OUTPATIENT
Start: 2021-06-25 | End: 2021-08-20

## 2021-06-25 RX ORDER — LIDOCAINE 40 MG/G
CREAM TOPICAL
Status: DISCONTINUED | OUTPATIENT
Start: 2021-06-25 | End: 2021-06-25 | Stop reason: HOSPADM

## 2021-06-25 RX ORDER — NALOXONE HYDROCHLORIDE 0.4 MG/ML
0.2 INJECTION, SOLUTION INTRAMUSCULAR; INTRAVENOUS; SUBCUTANEOUS
Status: DISCONTINUED | OUTPATIENT
Start: 2021-06-25 | End: 2021-06-26 | Stop reason: HOSPADM

## 2021-06-25 RX ORDER — DEXAMETHASONE SODIUM PHOSPHATE 4 MG/ML
INJECTION, SOLUTION INTRA-ARTICULAR; INTRALESIONAL; INTRAMUSCULAR; INTRAVENOUS; SOFT TISSUE PRN
Status: DISCONTINUED | OUTPATIENT
Start: 2021-06-25 | End: 2021-06-25

## 2021-06-25 RX ORDER — CEFAZOLIN SODIUM 2 G/50ML
2 SOLUTION INTRAVENOUS SEE ADMIN INSTRUCTIONS
Status: DISCONTINUED | OUTPATIENT
Start: 2021-06-25 | End: 2021-06-25 | Stop reason: HOSPADM

## 2021-06-25 RX ORDER — GABAPENTIN 300 MG/1
300 CAPSULE ORAL ONCE
Status: COMPLETED | OUTPATIENT
Start: 2021-06-25 | End: 2021-06-25

## 2021-06-25 RX ORDER — FENTANYL CITRATE 50 UG/ML
25-50 INJECTION, SOLUTION INTRAMUSCULAR; INTRAVENOUS
Status: DISCONTINUED | OUTPATIENT
Start: 2021-06-25 | End: 2021-06-25 | Stop reason: HOSPADM

## 2021-06-25 RX ORDER — FENTANYL CITRATE 50 UG/ML
25-50 INJECTION, SOLUTION INTRAMUSCULAR; INTRAVENOUS
Status: DISCONTINUED | OUTPATIENT
Start: 2021-06-25 | End: 2021-06-26 | Stop reason: HOSPADM

## 2021-06-25 RX ORDER — ACETAMINOPHEN 325 MG/1
650 TABLET ORAL EVERY 4 HOURS PRN
Qty: 50 TABLET | Refills: 0 | Status: SHIPPED | OUTPATIENT
Start: 2021-06-25

## 2021-06-25 RX ORDER — MEPERIDINE HYDROCHLORIDE 25 MG/ML
12.5 INJECTION INTRAMUSCULAR; INTRAVENOUS; SUBCUTANEOUS
Status: DISCONTINUED | OUTPATIENT
Start: 2021-06-25 | End: 2021-06-26 | Stop reason: HOSPADM

## 2021-06-25 RX ORDER — AMOXICILLIN 250 MG
1-2 CAPSULE ORAL 2 TIMES DAILY
Qty: 30 TABLET | Refills: 0 | Status: SHIPPED | OUTPATIENT
Start: 2021-06-25 | End: 2021-08-20

## 2021-06-25 RX ORDER — ALBUTEROL SULFATE 0.83 MG/ML
2.5 SOLUTION RESPIRATORY (INHALATION) EVERY 4 HOURS PRN
Status: DISCONTINUED | OUTPATIENT
Start: 2021-06-25 | End: 2021-06-26 | Stop reason: HOSPADM

## 2021-06-25 RX ORDER — NALOXONE HYDROCHLORIDE 0.4 MG/ML
0.4 INJECTION, SOLUTION INTRAMUSCULAR; INTRAVENOUS; SUBCUTANEOUS
Status: DISCONTINUED | OUTPATIENT
Start: 2021-06-25 | End: 2021-06-25 | Stop reason: HOSPADM

## 2021-06-25 RX ORDER — CEFAZOLIN SODIUM 2 G/50ML
2 SOLUTION INTRAVENOUS
Status: COMPLETED | OUTPATIENT
Start: 2021-06-25 | End: 2021-06-25

## 2021-06-25 RX ADMIN — FENTANYL CITRATE 50 MCG: 50 INJECTION, SOLUTION INTRAMUSCULAR; INTRAVENOUS at 12:32

## 2021-06-25 RX ADMIN — FENTANYL CITRATE 50 MCG: 50 INJECTION, SOLUTION INTRAMUSCULAR; INTRAVENOUS at 12:46

## 2021-06-25 RX ADMIN — PROPOFOL 250 MG: 10 INJECTION, EMULSION INTRAVENOUS at 12:32

## 2021-06-25 RX ADMIN — PROPOFOL 200 MCG/KG/MIN: 10 INJECTION, EMULSION INTRAVENOUS at 12:32

## 2021-06-25 RX ADMIN — CEFAZOLIN SODIUM 2 G: 2 SOLUTION INTRAVENOUS at 11:55

## 2021-06-25 RX ADMIN — ONDANSETRON 4 MG: 2 INJECTION INTRAMUSCULAR; INTRAVENOUS at 13:50

## 2021-06-25 RX ADMIN — OXYCODONE HYDROCHLORIDE 5 MG: 5 TABLET ORAL at 14:48

## 2021-06-25 RX ADMIN — GABAPENTIN 300 MG: 300 CAPSULE ORAL at 11:18

## 2021-06-25 RX ADMIN — Medication 0.5 MG: at 13:38

## 2021-06-25 RX ADMIN — FENTANYL CITRATE 50 MCG: 50 INJECTION, SOLUTION INTRAMUSCULAR; INTRAVENOUS at 11:35

## 2021-06-25 RX ADMIN — FENTANYL CITRATE 50 MCG: 50 INJECTION, SOLUTION INTRAMUSCULAR; INTRAVENOUS at 14:39

## 2021-06-25 RX ADMIN — FENTANYL CITRATE 50 MCG: 50 INJECTION, SOLUTION INTRAMUSCULAR; INTRAVENOUS at 14:43

## 2021-06-25 RX ADMIN — DEXAMETHASONE SODIUM PHOSPHATE 4 MG: 4 INJECTION, SOLUTION INTRA-ARTICULAR; INTRALESIONAL; INTRAMUSCULAR; INTRAVENOUS; SOFT TISSUE at 12:32

## 2021-06-25 RX ADMIN — SODIUM CHLORIDE, SODIUM LACTATE, POTASSIUM CHLORIDE, CALCIUM CHLORIDE: 600; 310; 30; 20 INJECTION, SOLUTION INTRAVENOUS at 11:18

## 2021-06-25 RX ADMIN — ACETAMINOPHEN 975 MG: 325 TABLET ORAL at 11:18

## 2021-06-25 ASSESSMENT — MIFFLIN-ST. JEOR: SCORE: 1623.4

## 2021-06-25 ASSESSMENT — LIFESTYLE VARIABLES: TOBACCO_USE: 1

## 2021-06-25 NOTE — ANESTHESIA POSTPROCEDURE EVALUATION
Patient: Ysabel Diggs    Procedure(s):  right knee examination under anesthesia, knee arthroscopy, anterior cruciate ligament reconstruction hamstring autograft. lateral meniscectomy  REPAIR, MEDIAL  MENISCUS, KNEE, ARTHROSCOPIC    Diagnosis:Post-traumatic osteoarthritis of right knee [M17.31]  Diagnosis Additional Information: No value filed.    Anesthesia Type:  General, Peripheral Nerve Block    Note:  Disposition: Outpatient   Postop Pain Control: Uneventful            Sign Out: Well controlled pain   PONV: No   Neuro/Psych: Uneventful            Sign Out: Acceptable/Baseline neuro status   Airway/Respiratory: Uneventful            Sign Out: Acceptable/Baseline resp. status   CV/Hemodynamics: Uneventful            Sign Out: Acceptable CV status; No obvious hypovolemia; No obvious fluid overload   Other NRE: NONE   DID A NON-ROUTINE EVENT OCCUR? No           Last vitals:  Vitals:    06/25/21 1428 06/25/21 1430 06/25/21 1445   BP: (!) 138/101 (!) 132/102 (!) 130/100   Pulse: 75 76 73   Resp: 15 12 11   Temp: 36.3  C (97.3  F)     SpO2: 98% 100% 97%       Last vitals prior to Anesthesia Care Transfer:  CRNA VITALS  6/25/2021 1355 - 6/25/2021 1451      6/25/2021             Resp Rate (set):  10          Electronically Signed By: Santo Munroe MD  June 25, 2021  2:51 PM

## 2021-06-25 NOTE — ANESTHESIA PREPROCEDURE EVALUATION
Anesthesia Pre-Procedure Evaluation    Patient: Ysabel Diggs   MRN: [id number] : 1980        Preoperative Diagnosis: Post-traumatic osteoarthritis of right knee [M17.31]   Procedure : Procedure(s):  right knee examination under anesthesia, knee arthroscopy, anterior cruciate ligament reconstruction hamstring autograft. lateral meniscectomy  REPAIR, MEDIAL  MENISCUS, KNEE, ARTHROSCOPIC     Past Medical History:   Diagnosis Date     Esophagitis 2010    LA Grade C reflux esophagitis on endoscopy     Gastric ulcer, unspecified as acute or chronic, without mention of hemorrhage or perforation 2010    on Upper endo     H. pylori infection 2010     Plantar warts      Smoking       Past Surgical History:   Procedure Laterality Date     C APPENDECTOMY       UPPER GI ENDOSCOPY  2010    Esophagitis + stomach ulcer      No Known Allergies   Social History     Tobacco Use     Smoking status: Current Some Day Smoker     Packs/day: 0.50     Types: Cigarettes     Smokeless tobacco: Never Used     Tobacco comment: 10-15 cig a day   Substance Use Topics     Alcohol use: Yes     Comment: [place].      Wt Readings from Last 1 Encounters:   21 74.4 kg (164 lb)        Anesthesia Evaluation   Pt has had prior anesthetic.     No history of anesthetic complications       ROS/MED HX  ENT/Pulmonary:     (+) tobacco use, Current use,     Neurologic:  - neg neurologic ROS     Cardiovascular:  - neg cardiovascular ROS     METS/Exercise Tolerance: >4 METS    Hematologic:  - neg hematologic  ROS     Musculoskeletal:   (+) arthritis (Right knee),     GI/Hepatic:     (+) GERD (Intermittent),     Renal/Genitourinary:  - neg Renal ROS     Endo:  - neg endo ROS     Psychiatric/Substance Use:  - neg psychiatric ROS     Infectious Disease:  - neg infectious disease ROS     Malignancy:  - neg malignancy ROS     Other:  - neg other ROS          Physical Exam    Airway  airway exam normal           Respiratory  Devices and Support         Dental  no notable dental history         Cardiovascular   cardiovascular exam normal          Pulmonary   pulmonary exam normal                OUTSIDE LABS:  CBC:   Lab Results   Component Value Date    WBC 5.8 04/08/2021    WBC 4.2 03/11/2010    HGB 15.2 04/08/2021    HGB 16.6 03/11/2010    HCT 43.3 04/08/2021    HCT 47.8 03/11/2010     04/08/2021     03/11/2010     BMP:   Lab Results   Component Value Date     04/08/2021     03/11/2010    POTASSIUM 3.6 04/08/2021    POTASSIUM 3.8 03/11/2010    CHLORIDE 104 04/08/2021    CHLORIDE 103 03/11/2010    CO2 24 04/08/2021    CO2 25 03/11/2010    BUN 9 04/08/2021    BUN 10 03/11/2010    CR 0.72 04/08/2021    CR 0.90 03/11/2010     (H) 04/08/2021    GLC 85 03/11/2010     COAGS: No results found for: PTT, INR, FIBR  POC: No results found for: BGM, HCG, HCGS  HEPATIC:   Lab Results   Component Value Date    ALBUMIN 4.1 04/08/2021    PROTTOTAL 8.0 04/08/2021    ALT 75 (H) 04/08/2021    AST 47 (H) 04/08/2021    ALKPHOS 100 04/08/2021    BILITOTAL 0.4 04/08/2021     OTHER:   Lab Results   Component Value Date    A1C 5.5 09/02/2015    SUN 9.6 04/08/2021    MAG 2.1 04/07/2011    LIPASE 113 04/08/2021       Anesthesia Plan    ASA Status:  2   NPO Status:  NPO Appropriate    Anesthesia Type: General.     - Airway: LMA   Induction: Intravenous.   Maintenance: Balanced.        Consents    Anesthesia Plan(s) and associated risks, benefits, and realistic alternatives discussed. Questions answered and patient/representative(s) expressed understanding.     - Discussed with:  Patient      - Specific Concerns: PONV, sore throat, airway injury, block complications, major complications.        Postoperative Care    Pain management: IV analgesics, Oral pain medications, Peripheral nerve block (Single Shot), Multi-modal analgesia.   PONV prophylaxis: Ondansetron (or other 5HT-3), Dexamethasone or Solumedrol, Background Propofol  Infusion     Comments:                Santo Munroe MD

## 2021-06-25 NOTE — OP NOTE
PREOPERATIVE DIAGNOSIS:   1. ACL tear Right knee  2. Medial meniscus tear  3. Lateral meniscus tear    POSTOPERATIVE DIAGNOSIS:  1. ACL tear Right knee  2. Repairable tear of the medial meniscus  3. Unrepairable tear of the lateral meniscus  4. Grade 2 chondrosis of the patella, small grade 2 fissure of the trochlea  5. Grade 2 chondrosis of the lateral femoral condyle    PROCEDURE:  1. Examination under anesthesia Right Knee  2. Right Knee arthroscopy  3. Right ACL reconstruction Hamstring autograft  4. Medial meniscus inside out repair  5. Partial lateral meniscectomy  6. Chondroplasty of the patella, trochlea and lateral femoral condyle    DATE OF SURGERY: 6/25/2021    SURGEON: Devyn Beadr MD    ASSISTANT: None.     RESIDENT OR FELLOW: Schuyler Mo MD    OPERATIVE INDICATIONS: Ysabel Diggs is a pleasant 40 year old who I saw through my orthopedic clinic with a history, physical, imaging consistent with Right ACL tear.  I reviewed with the patient the risks, benefits, complications, techniques and alternatives to surgery.  We reviewed the expected course of recovery and the potential expected outcomes.  The patient understood both the risks and benefits and desired to proceed despite the risks.    OPERATIVE DETAILS: In the preoperative area the patient's informed consent was reviewed and they desired to proceed.  The right leg was marked and the patient was in agreement.  The patient was taken to the operating room where a timeout was performed and all parties were in agreement.  Preoperative antibiotics were given within 1 hour of the time of incision.  The patient was placed in the supine position and surrendered to LMA anesthesia.  No tourniquet was applied.  Egg crate was placed beneath the well leg and a side post was utilized.  The operative leg was prepped and draped in the usual sterile fashion.     Examination under anesthesia: Range of motion 0-135, 1 quadrant medial and 2 quadrant lateral  translation of the patella, stable to varus and valgus stress testing, stable posterior drawer testing, 2+ anterior drawer testing, 2B Lachman, 1+ pivot shift    Graft harvest and preparation: A 4 cm incision was made over the anterior medial tibia.  It was carried down through the skin and subcutaneous tissues and meticulous hemostasis was insured.  The fascia was opened with an apex anterior-superior-based incision and the sartorius fascia was identified.  A marking suture was placed at this top corner and the fascia was reflected exposing the semi-tendinosis.  A right angle snap was used to free the semi-tendinosis from the overlying fascia and the adhesions were removed sharply.  Once there was adequate excursion of the tendon across the tibial tubercle, and no tenting of the gastroc fascia a small tendon stripper was introduced and the tendon was harvested free.    It was taken to the back table where a graft link technology was employed.  The graft was quadrupled, running locking fiber loop was placed on each tail and the folds of the graft were doubled over the [employer].  Circumferential sutures were placed at 1 and 2 cm.  The final graft dimensions measured 70 mm of length by 9 mm.  The graft was tensioned at 20 pounds for 20 minutes to remove any creep.    Anterior medial and anterior lateral arthroscopic portals were created and a diagnostic arthroscopy was performed with the following findings: The medial patella facet, lateral patella facet, central ridge of the patella showed grade 2 cartilage.  The trochlear cartilage was a small grade 2 fissure.  The medial femoral condyle showed normal cartilage and medial tibial plateau showed normal cartilage. The lateral femoral condyle showed grade 2-3  cartilage and lateral tibial plateau showed grade 1 softening. The Medial meniscus showed a vertical tear with a large unstable fragment and Lateral Meniscus was a vertical tear, clear complex component,  clearly unrepairable.  There was a grade 3 rupture of the anterior cruciate ligament with positive empty wall sign.  The PCL was intact.  There was no opening to varus and valgus stress testing in either the lateral or medial compartments, respectively.    A debridement of the nonfunctioning ACL was then performed until we could visualize the anatomic insertion sites of the ACL on both the femoral and the tibial origin.  Remnant preservation was pursued in the tibial side and the tibial tunnel was centered midway between the medial and lateral tibial spines in line with the posterior aspect of the anterior horn of the lateral meniscus.    The arthroscope was placed into the medial portal and a 6-9 guide was placed into the lateral portal we selected our position along the lateral femoral wall.  The osseous length measured 38 mm.  A 9 mm flip cutter was then introduced through the lateral wall and a 25 mm socket was reamed.  The flip cutter was placed into the straight position and was replaced with a fiber loop suture.  Arthroscopic visualization showed excellent position of the femoral tunnel.  Excess bone from the reamings was then removed arthroscopically.    The arthroscope was then returned to the lateral portal, a tip to tip guide was introduced.  Our osseous length measured 40 mm.  The flip cutter was placed and a 30 mm socket was reamed.  The flip cutter was then returned to the straight position and a fiber loop passing suture was placed.    At this time we elected to perform a repair of her medial meniscus.  To do this a 3 cm incision was made third above and two thirds below the medial joint line in line with the palpable posterior border the tibia.  Fascia was opened hemostasis was was insured.  Popliteal retractor was placed in a series of 5, 2-0 fiber tape sutures on meniscus needles were then placed providing excellent compression meniscus to the capsule.  No further instability was present.    We  "then turned our attention to the to the lateral side where a partial lateral meniscectomy was completed to about stable rim of meniscus remained.    Finally a chondroplasty of the patella, trochlea and lateral femoral condyle was complete until about stable rim of cartilage remained.    The medial portal was enlarged.  We confirm that there were no soft tissue bridges.  The graft was brought up onto the field reduced to the medial portal.  The cortical button was deployed over the lateral cortex.  Approximately 20 mm of graft was then reduced into the femoral tunnel.  The remainder of the graft was reduced in the tibia.  We then \"balanced\" the graft within the knee joint with 20 mm of graft in the femoral side, 20 mm of graft in the tibial side.  Tensioning and retensioning was then performed in full extension.  Final knot-tying was performed on the tibia and the femoral side.  Examination showed Lachman of 0, no pivot shift. Final arthroscopic images showed good position of the graft, good tension to probing, clearance along the roof of the intercondylar notch in terminal extension clearance along the lateral wall and PCL in flexion.    Copious irrigation was performed an a layered closure was initiated, sterile dressings were applied and the patient was transferred to the recovery room in stable condition with stable vital signs.    ESTIMATED BLOOD LOSS: 25 mL.    TOURNIQUET TIME: No tourniquet was placed.    COMPLICATIONS: None apparent.    DRAINS: None.    SPECIMENS: None.     POSTOPERATIVE PLAN:  Weightbearing as tolerated with hinged knee brace locked in full extension x 6 weeks  No motion x 1 week, then range of motion 0-90 degrees when sitting or therapy  Hinged knee brace on and locked when up and around, off or unlocked for sitting or therapy  OK for motion with PT or during home pt sessions during first week  Wean from crutches when able, at 6 weeks WBAT with brace on and unlocked, then wean when able  No " running until 3 months  No sports until 6 months, return to game competition at 7-10 months  Shower on day 3  Start physical therapy day 3-5

## 2021-06-25 NOTE — DISCHARGE INSTRUCTIONS
"[facility]  Home Care Following Anesthesia  For 24 hours after surgery:  1. Get plenty of rest.  A responsible adult must stay with you for at least 24 hours after you leave the surgery center.  2. Do not drive or use heavy equipment.  If you have weakness or tingling, don't drive or use heavy equipment until this feeling goes away.   3. Do not drink alcohol.   4. Avoid strenuous or risky activities.  Ask for help when climbing stairs.  5. You may feel lightheaded.  IF so, sit for a few minutes before standing.  Have someone help you get up.   6. If you have nausea (feel sick to your stomach): Drink only clear liquids such as apple juice, ginger ale, broth or 7-Up.  Rest may also help.  Be sure to drink enough fluids.  Move to a regular diet as you feel able.   7. You may have a slight fever.  Call the doctor if your fever is over 100 F (37.7 C) (taken under the tongue) or lasts longer than 24 hours.  8. You may have a dry mouth, a sore throat, muscle aches or trouble sleeping. These should go away after 24 hours.  9. Do not make important or legal decisions.   10. It is recommended to avoid smoking.        Today you received a Marcaine or bupivacaine block to numb the nerves near your surgery site.  This is a block using local anesthetic or \"numbing\" medication injected around the nerves to anesthetize or \"numb\" the area supplied by those nerves.  This block is injected into the muscle layer near your surgical site.  The medication may numb the location where you had surgery for 6-18 hours, but may last up to 24 hours.  If your surgical site is an arm or leg you should be careful with your affected limb, since it is possible to injure your limb without being aware of it due to the numbing.  Until full feeling returns, you should guard against bumping or hitting your limb, and avoid extreme hot or cold temperatures on the skin.  As the block wears off, the feeling will return as " a tingling or prickly sensation near your surgical site.  You will experience more discomfort from your incision as the feeling returns.  You may want to take a pain pill (a narcotic or Tylenol if this was prescribed by your surgeon) when you start to experience mild pain before the pain beccomes more severe.  If your pain medications do not control your pain you should notifiy your surgeon.    Tips for taking pain medications  To get the best pain relief possible, remember these points:    Take pain medications as directed, before pain becomes severe.    Pain medication can upset your stomach: taking it with food may help.    Constipation is a common side effect of pain medication. Drink plenty of  fluids.    Eat foods high in fiber. Take a stool softener if recommended by your doctor or pharmacist.    Do not drink alcohol, drive or operate machinery while taking pain medications.    Ask about other ways to control pain, such as with heat, ice or relaxation.    Tylenol/Acetaminophen Consumption  To help encourage the safe use of acetaminophen, the makers of TYLENOL  have lowered the maximum daily dose for single-ingredient Extra Strength TYLENOL  (acetaminophen) products sold in the [place] from 8 pills per day (4,000 mg) to 6 pills per day (3,000 mg). The dosing interval has also changed from 2 pills every 4-6 hours to 2 pills every 6 hours.    If you feel your pain relief is insufficient, you may take Tylenol/Acetaminophen in addition to your narcotic pain medication.     Be careful not to exceed 3,000 mg of Tylenol/Acetaminophen in a 24 hour period from all sources.    If you are taking extra strength Tylenol/acetaminophen (500 mg), the maximum dose is 6 tablets in 24 hours.    If you are taking regular strength acetaminophen (325 mg), the maximum dose is 9 tablets in 24 hours.    Call a doctor for any of the followin. Signs of infection (fever, growing tenderness at the surgery site, a large amount of  "drainage or bleeding, severe pain, foul-smelling drainage, redness, swelling).  2. It has been over 8 to 10 hours since surgery and you are still not able to urinate (pass water).  3. Headache for over 24 hours.  4. Numbness, tingling or weakness the day after surgery (if you had spinal anesthesia).  5. Signs of Covid-19 infection (temperature over 100 degrees, shortness of breath, cough, loss of taste/smell, generalized body aches, persistent headache, chills, sore throat, nausea/vomiting/diarrhea)  Your doctor is:Dr. Devyn Beard, Orthopaedics: [phone]               Or dial [phone] and ask for the resident on call for:  Orthopaedics  For emergency care, call the:  [facility] Emergency Department: [phone] (TTY for hearing impaired: [phone])      Safety Tips for Using Crutches    Crutch Fit:    Assume good standing posture with shoulders relaxed and crutch tips 6-8 inches out from the side of the foot.    The underarm pad should fall 2-3 fingers width below the armpit.    The handgrip is positioned level with the wrist to allow 30  flexion at the elbow.    Safety Tips:    Bear weight on your hands, not on your armpits.    Do not add extra padding to the underarm pad. This will, in effect, lengthen the crutches and increase risk of nerve injury.    Wear flat, properly fitting shoes. Do not walk in stocking feet, high heels or slippers.    Household hazards:  --Throw rugs should be removed from floors.  --Stairs should be cleared of obstacles.  --Use extra caution on slippery, highly polished, littered or uneven floor surfaces.  --Check for electric cords.    Check crutch tips for excessive wear and keep wing nuts tight.    While walking, look forward with  head up  and  eyes open.  Take equal length steps.    Use BOTH crutches.    Stairs Sequence:    UP: \"Good\" leg first, followed by  bad  leg, then crutches.    DOWN: Crutches, followed by  bad  leg, \"good\" leg.     Walking with " "Crutches:    Move both crutches forward at the same time.    Non-Weight Bearing (NWB):  Hold the involved leg up and swing through the crutches with the involved leg. The involved leg does not touch the floor.    Toe Touch Weight Bearing (TTWB): Move the involved leg forward. Rest it lightly on the floor for balance only. Step through the crutches with the uninvolved leg.    Partial Weight Bearing (PWB): Move the involved leg forward. Step down the weight of the leg only.  Step through the crutches with the uninvolved leg.    Weight Bearing As Tolerated (WBAT): Move the involved leg forward. Put as much pressure through the involved leg as you can tolerate comfortably. Then step through the crutches with the uninvolved leg.  Information about liposomal bupivacaine (Exparel)    What is Liposomal Bupivacaine?    Liposomal Bupivacaine is a numbing medication that can help you manage your pain after surgery.  This medication is similar to \"novacaine,\" which is often used by the dentist.  Liposomal bupivacaine is released slowly and can help control pain for up to 72 hours.    What is the purpose of Liposomal Bupivacaine?    To manage your pain after surgery    To help you sleep better, take deep breaths, walk more comfortable, and feel up to visiting with others    How is the procedure done?    Liposomal bupivacaine is a medication given by an injection.    It is usually given right before your surgery.  If this is the case, you will be awake or sedated, but you should experience minimal pain during the procedure.    For some people, the injection may be given at the very end of your surgery.  It all depends on the type of surgery and your situation.    The procedure usually takes about 5-15 minutes.  An ultrasound machine will help the anesthesiologist insert it in the right place or the surgeon will inject it under direct vision.     A needle is used to place the numbing medication under your skin.  It provides pain " relief by numbing the tissue in the area where your surgeon will make the incision.    What can I expect?    You may experience numbness, tingling, or a feeling of heaviness around the area that was injected.    If you experience any of the follow symptoms IMMEDIATELY CALL THE REGIONAL ANESTHESIA PAIN SERVICE:    Numbness or tingling occurs in areas other than around the injection site    Blurry vision    Ringing in your ears    A metallic taste in your mouth    PAGE: Dial [phone].  When prompted, enter the following 4-digit ID number:  [id number].  You will be prompted to enter your phone number; and then enter the # sign.  The clinician on call will call you back.    OR    CALL: Dial [phone].  Let the hospital  know that you are having a problem with a nerve block and that you would like to speak to the regional anesthesia pain service right away.    You should not receive any other type of numbing medication within 4 days after receiving liposomal bupivacaine unless your anesthesiologist approves.    Post Operative Instructions: Regional Anesthetic for Lower Extremity with Liposomal Bupivacaine  General Information:   Regional anesthesia is when local anesthetic or  numbing  medication is injected around the nerves to anesthetize or  numb  the area supplied by that set of nerves. It is a type of analgesia used to control pain and decreases the need for narcotics following surgery.    Types of Regional Blocks:  Adductor Canal: A block injected into the mid thigh of the operative leg of a patient having knee or ankle surgery.    Procedure:   The type of anesthesia your doctor used to numb your leg will usually not wear off for 24-48 hours, but may last as long as 72 hours. You should be careful during that period, since it is possible to injure your leg without being aware of the injury. While your leg is numb you should:    Use crutches (minimal weight bearing until your motor and strength is  completely back to normal)    Avoid striking or bumping your leg    Avoid extreme hot or cold    Discomfort:  You will have a tingling and prickly sensation in your leg as the feeling begins to return; you can also expect some discomfort. The amount of discomfort is unpredictable, but if you have more pain than can be controlled with pain medication, you should notify your physician.     Pain Medicine:   Begin taking your oral pain pills before bedtime and during the night to avoid a sudden onset of pain as part of the block wears off. Do not engage in drinking, driving, or hazardous occupations while taking pain medication.

## 2021-06-25 NOTE — ANESTHESIA CARE TRANSFER NOTE
Patient: Ysabel Diggs    Procedure(s):  right knee examination under anesthesia, knee arthroscopy, anterior cruciate ligament reconstruction hamstring autograft. lateral meniscectomy  REPAIR, MEDIAL  MENISCUS, KNEE, ARTHROSCOPIC    Diagnosis: Post-traumatic osteoarthritis of right knee [M17.31]  Diagnosis Additional Information: No value filed.    Anesthesia Type:   General, Peripheral Nerve Block     Note:    Oropharynx: spontaneously breathing  Level of Consciousness: awake  Oxygen Supplementation: nasal cannula  Level of Supplemental Oxygen (L/min / FiO2): 3  Independent Airway: airway patency satisfactory and stable  Dentition: dentition unchanged  Vital Signs Stable: post-procedure vital signs reviewed and stable  Report to RN Given: handoff report given  Patient transferred to: PACU    Handoff Report: Identifed the Patient, Identified the Reponsible Provider, Reviewed the pertinent medical history, Discussed the surgical course, Reviewed Intra-OP anesthesia mangement and issues during anesthesia, Set expectations for post-procedure period and Allowed opportunity for questions and acknowledgement of understanding      Vitals: (Last set prior to Anesthesia Care Transfer)  CRNA VITALS  6/25/2021 1355 - 6/25/2021 1430      6/25/2021             Resp Rate (set):  10        Electronically Signed By: ELOISE Davis CRNA  June 25, 2021  2:30 PM

## 2021-06-25 NOTE — OR NURSING
Patient received right side Adductor nerve block  with Exparel.  Fentanyl 50mcg and Versed 1mg given. Tolerated procedure well.       Sarah Lisa RN

## 2021-06-26 ENCOUNTER — NURSE TRIAGE (OUTPATIENT)
Dept: NURSING | Facility: CLINIC | Age: 41
End: 2021-06-26

## 2021-06-26 DIAGNOSIS — M17.31 POST-TRAUMATIC OSTEOARTHRITIS OF RIGHT KNEE: Primary | ICD-10-CM

## 2021-06-26 RX ORDER — METHOCARBAMOL 500 MG/1
500 TABLET, FILM COATED ORAL EVERY 6 HOURS PRN
Qty: 40 TABLET | Refills: 0 | Status: SHIPPED | OUTPATIENT
Start: 2021-06-26 | End: 2021-08-20

## 2021-06-26 RX ORDER — HYDROXYZINE HYDROCHLORIDE 25 MG/1
25 TABLET, FILM COATED ORAL EVERY 6 HOURS PRN
Qty: 40 TABLET | Refills: 0 | Status: SHIPPED | OUTPATIENT
Start: 2021-06-26 | End: 2021-08-20

## 2021-06-26 NOTE — PROGRESS NOTES
Brief Ortho Note    Called by Nurse Care Line regarding this patient POD1 from R knee arthroscopy, ACL reconstruction with hamstring autograft, medial meniscus repair with ongoing post-op pain and swelling. His pain was well controlled until his block wore off overnight. He is taking 10mg oxycodone Q4H and Tylenol. He has been icing and elevating the leg. Pain is localized to the knee and hamstring area. No new numbness or tingling.     We discussed that post-op pain and swelling are to be expected, especially as the block wears off. Recommended continuing ice, elevation, and current pain medications as presribed. May use ibuprofen as needed. Also will try hydroxyzine and robaxin for further pain control. Hydroxyzine 25mg Q4h PRN and robaxin 750mg TID PRN also ordered to be sent to [facility] in [place]. E-prescribing down, called in instead. Patient's fiancee states that patient is very anxious and would like to present to ED for further evaluation. Discussed that patient should try the additional pain medications first to see if his pain improves, however, patient would like to present for evaluation.      Mari William MD  Orthopaedic Surgery PGY4  Pager: [phone]

## 2021-06-26 NOTE — TELEPHONE ENCOUNTER
Dee calling - just had knee surgery yesterday ACL reconstruction  Pain meds aren't working - is in a lot of pain  And a lot of swelling. Is icing it.   Oxycodone and acetaminophen not helping - took at 10p - and then took at 0700 and again at 1100. Rates 10/10 pain. T99.0.     [phone] - has tried - not receiving call back.     Paged on- call ortho resident Dr. William @[phone] to call patient back directly.   0276- reviewed chart - patient has spoken with provider.    Judith Hargrove RN on 6/26/2021 at 12:52 PM    Reason for Disposition    [1] SEVERE post-op pain (e.g., excruciating, pain scale 8-10) AND [2] not controlled with pain medications    Additional Information    Negative: Sounds like a life-threatening emergency to the triager    Negative: Chest pain    Negative: Difficulty breathing    Negative: Acting confused (e.g., disoriented, slurred speech) or excessively sleepy    Negative: Surgical incision symptoms and questions    Negative: [1] Discomfort (pain, burning or stinging) when passing urine AND [2] male    Negative: [1] Discomfort (pain, burning or stinging) when passing urine AND [2] female    Negative: Constipation    Negative: New or worsening leg (calf, thigh) pain    Negative: New or worsening leg swelling    Negative: Dizziness is severe, or persists > 24 hours after surgery    Negative: Pain, redness, swelling, or pus at IV Site    Negative: Symptoms arising from use of a urinary catheter (Ponce or Coude)    Negative: Cast problems or questions    Negative: Medication question    Negative: [1] Widespread rash AND [2] bright red, sunburn-like    Negative: [1] SEVERE headache AND [2] after spinal (epidural) anesthesia    Negative: [1] Vomiting AND [2] persists > 4 hours    Negative: [1] Vomiting AND [2] abdomen looks much more swollen than usual    Negative: [1] Drinking very little AND [2] dehydration suspected (e.g., no urine > 12 hours, very dry mouth, very lightheaded)    Negative: Patient  sounds very sick or weak to the triager    Negative: Sounds like a serious complication to the triager    Negative: Fever > 100.4 F (38.0 C)    Protocols used: POST-OP SYMPTOMS AND [id number]

## 2021-06-26 NOTE — PROGRESS NOTES
Addendum to previous note- medications called in to [facility] were hydroxyzine 25mg Q4H prn and robaxin 500mg Q6h prn.

## 2021-06-28 DIAGNOSIS — M17.31 POST-TRAUMATIC OSTEOARTHRITIS OF RIGHT KNEE: ICD-10-CM

## 2021-06-28 NOTE — TELEPHONE ENCOUNTER
[facility] Call Center    Phone Message    May a detailed message be left on voicemail: yes     Reason for Call: Medication Refill Request    Has the patient contacted the pharmacy for the refill? Yes   Name of medication being requested: Oxycodone 5MG  Provider who prescribed the medication: Devyn Beard  Pharmacy: [facility] [phone]  Date medication is needed: ASAP      Action Taken: Message routed to:  [facility] ([facility]): Orthopedics    Travel Screening: Not Applicable      [facility]    Phone Message    May a detailed message be left on voicemail: yes     Reason for Call: Other: Patient and his fiance are also wondering if there are any special instructions on how to take the dressings off.     Action Taken: Message routed to:  [facility] ([facility]): Orthopedics    Travel Screening: Not Applicable

## 2021-06-29 DIAGNOSIS — M17.31 POST-TRAUMATIC OSTEOARTHRITIS OF RIGHT KNEE: Primary | ICD-10-CM

## 2021-06-29 RX ORDER — OXYCODONE HYDROCHLORIDE 5 MG/1
5-10 TABLET ORAL EVERY 4 HOURS PRN
Qty: 20 TABLET | Refills: 0 | Status: SHIPPED | OUTPATIENT
Start: 2021-06-29 | End: 2021-08-20

## 2021-06-29 NOTE — TELEPHONE ENCOUNTER
Called and talked with patient, we discussed the dressing and wound care. Discussed bracing and we will look at this on Thursday, one of them is rubbing his suture. No other questions.,  Radha Hyman RN

## 2021-06-30 ENCOUNTER — THERAPY VISIT (OUTPATIENT)
Dept: PHYSICAL THERAPY | Facility: CLINIC | Age: 41
End: 2021-06-30
Attending: ORTHOPAEDIC SURGERY
Payer: COMMERCIAL

## 2021-06-30 DIAGNOSIS — M17.31 POST-TRAUMATIC OSTEOARTHRITIS OF RIGHT KNEE: ICD-10-CM

## 2021-06-30 DIAGNOSIS — S83.511A CHRONIC RUPTURE OF ANTERIOR CRUCIATE LIGAMENT OF RIGHT KNEE: ICD-10-CM

## 2021-06-30 PROBLEM — M23.611: Status: ACTIVE | Noted: 2021-06-30

## 2021-06-30 PROCEDURE — 97110 THERAPEUTIC EXERCISES: CPT | Mod: GP | Performed by: PHYSICAL THERAPIST

## 2021-06-30 PROCEDURE — 97161 PT EVAL LOW COMPLEX 20 MIN: CPT | Mod: GP | Performed by: PHYSICAL THERAPIST

## 2021-06-30 ASSESSMENT — ACTIVITIES OF DAILY LIVING (ADL)
KNEE_ACTIVITY_OF_DAILY_LIVING_SCORE: 45.71
STAND: ACTIVITY IS SOMEWHAT DIFFICULT
SQUAT: I AM UNABLE TO DO THE ACTIVITY
HOW_WOULD_YOU_RATE_THE_CURRENT_FUNCTION_OF_YOUR_KNEE_DURING_YOUR_USUAL_DAILY_ACTIVITIES_ON_A_SCALE_FROM_0_TO_100_WITH_100_BEING_YOUR_LEVEL_OF_KNEE_FUNCTION_PRIOR_TO_YOUR_INJURY_AND_0_BEING_THE_INABILITY_TO_PERFORM_ANY_OF_YOUR_USUAL_DAILY_ACTIVITIES?: 20
RISE FROM A CHAIR: ACTIVITY IS FAIRLY DIFFICULT
KNEE_ACTIVITY_OF_DAILY_LIVING_SUM: 32
WALK: ACTIVITY IS SOMEWHAT DIFFICULT
WEAKNESS: THE SYMPTOM AFFECTS MY ACTIVITY SLIGHTLY
STIFFNESS: I DO NOT HAVE THE SYMPTOM
AS_A_RESULT_OF_YOUR_KNEE_INJURY,_HOW_WOULD_YOU_RATE_YOUR_CURRENT_LEVEL_OF_DAILY_ACTIVITY?: SEVERELY ABNORMAL
SIT WITH YOUR KNEE BENT: I AM UNABLE TO DO THE ACTIVITY
HOW_WOULD_YOU_RATE_THE_OVERALL_FUNCTION_OF_YOUR_KNEE_DURING_YOUR_USUAL_DAILY_ACTIVITIES?: SEVERELY ABNORMAL
GIVING WAY, BUCKLING OR SHIFTING OF KNEE: I DO NOT HAVE THE SYMPTOM
GO DOWN STAIRS: ACTIVITY IS SOMEWHAT DIFFICULT
PAIN: THE SYMPTOM AFFECTS MY ACTIVITY SEVERELY
SWELLING: THE SYMPTOM AFFECTS MY ACTIVITY SLIGHTLY
LIMPING: THE SYMPTOM AFFECTS MY ACTIVITY MODERATELY
KNEEL ON THE FRONT OF YOUR KNEE: I AM UNABLE TO DO THE ACTIVITY
GO UP STAIRS: ACTIVITY IS FAIRLY DIFFICULT
RAW_SCORE: 32

## 2021-06-30 NOTE — PROGRESS NOTES
Physical Therapy Initial Evaluation: Subjective History  Date of Surgery: 6/25/2021.    Start of Care date: 6/30/2021  Surgical Procedure/Limb: Right knee ACL reconstruction, hamstring graft, meniscectomy   Surgeon Name: Dr. Beard   Precautions/Restrictions:  no motion during first week, WBAT with knee brace locked full extension, 0-90 motion after 1 week   Weightbearing as tolerated with hinged knee brace locked in full extension x 6 weeks    Average Daily Pain Levels: 8/10 (Location: medial insertion; Quality: Aching/Throbbing)  Other Symptoms: numbness around incisions   Symptom Mgmt Strategies: oxycodone (started 10mg on Saturday), first few days of ice     Prior orthopaedic history/procedures: this is a reconstruction of a chronic ACL rupture, knee OA   Prior non-operative management: Self provided workouts   Next MD Appt Date: 7/1/2021     Functional limitations following procedure: ambulation, sit to stand, sitting   Previous level of function: no restrictions     Patient Employment: None  Desired Physical Activity (Goals): Recreational soccer with family, workout       Answers for HPI/ROS submitted by the patient on 6/26/2021   History Reported by Patient  Reason for Visit:: PT for post ACL reconstruction and meniscus repair.  When problem began:: 6/25/2021  How problem occurred:: Surgery was 06.25.21 from soccer injury 13 years ago  Number scale: 5/10  General health as reported by patient: good  Please check all that apply to your current or past medical history: osteoarthritis  Medical allergies: none  Surgeries: orthopedic surgery  Medications you are currently taking: muscle relaxants, pain medication  Occupation:: none  What are your primary job tasks: prolonged standing      Post-Operative Physical Therapy Examination    Physical Mobility Status  Gait: ambulates with 2 axillary crutches, and knee brace locked in full extension   Transfers:  Independent with knee brace locked in full extension      Anthropometric Measures     Right Left Difference   Joint ROM      Hyperextension 0 deg 2 deg 2 deg   Extension Lacks 5 deg 0 deg 5 deg   Flexion 50 deg 140 deg 90 deg   Circumferential Measures      Joint Line NT cm NT cm NT cm   15 cm Prox NT cm NT2 cm NT cm   Effusion 2+ 0        Quadriceps Muscle Activation Right Left   Isometric Quad Activation Poor, Delayed activation and Co-contraction noted with glutes Normal   Straight Leg Raising Unable to perform No extensor lag     Status of Incision: Clean & healing, in waiting room patient adjusted leg brace and tore steri strips away from skin causing local bleeding of incisions, seems that a scab was torn open - minimal bleeding and able to clot with pressure from gauze. New steri strips applied on lateral hamstring incision during PT.     Assessment/Plan    Patient is a 40 year old male with right side knee complaints.    Patient has the following significant findings with corresponding treatment plan.                Diagnosis 1:  s/p R ACL reconstruction  Pain -  hot/cold therapy, electric stimulation, manual therapy, splint/taping/bracing/orthotics, and self management  Decreased ROM/flexibility - manual therapy, therapeutic exercise, and home program  Decreased joint mobility - manual therapy and therapeutic exercise  Decreased strength - therapeutic exercise, therapeutic activities, and home program  Impaired balance - neuro re-education and therapeutic activities  Decreased proprioception - neuro re-education and therapeutic activities  Edema - vasopneumatics, electric stimulation, cold therapy, cryocuff, and self management/home program  Impaired gait - gait training and assistive devices  Impaired muscle performance - neuro re-education  Decreased function - therapeutic activities    Previous and current functional limitations:  (See Goal Flow Sheet for this information)    Short term and Long term goals: (See Goal Flow Sheet for this information)      Communication ability:  Patient appears to be able to clearly communicate and understand verbal and written communication and follow directions correctly.  Treatment Explanation - The following has been discussed with the patient:   RX ordered/plan of care  Anticipated outcomes  Possible risks and side effects  This patient would benefit from PT intervention to resume normal activities.   Rehab potential is good.    Frequency:  1 X week, once daily  Duration:  for 12 visits  Discharge Plan:  Achieve all LTG. Independent in home treatment program. Reach maximal therapeutic benefit.    Please refer to the daily flowsheet for treatment today, total treatment time and time spent performing 1:1 timed codes.

## 2021-07-01 ENCOUNTER — OFFICE VISIT (OUTPATIENT)
Dept: ORTHOPEDICS | Facility: CLINIC | Age: 41
End: 2021-07-01
Payer: COMMERCIAL

## 2021-07-01 ENCOUNTER — ANCILLARY PROCEDURE (OUTPATIENT)
Dept: GENERAL RADIOLOGY | Facility: CLINIC | Age: 41
End: 2021-07-01
Attending: ORTHOPAEDIC SURGERY
Payer: COMMERCIAL

## 2021-07-01 VITALS — SYSTOLIC BLOOD PRESSURE: 129 MMHG | OXYGEN SATURATION: 95 % | DIASTOLIC BLOOD PRESSURE: 89 MMHG | HEART RATE: 87 BPM

## 2021-07-01 DIAGNOSIS — M17.31 POST-TRAUMATIC OSTEOARTHRITIS OF RIGHT KNEE: ICD-10-CM

## 2021-07-01 DIAGNOSIS — M25.561 CHRONIC PAIN OF RIGHT KNEE: Primary | ICD-10-CM

## 2021-07-01 DIAGNOSIS — G89.29 CHRONIC PAIN OF RIGHT KNEE: Primary | ICD-10-CM

## 2021-07-01 PROCEDURE — 73560 X-RAY EXAM OF KNEE 1 OR 2: CPT | Mod: RT | Performed by: RADIOLOGY

## 2021-07-01 PROCEDURE — 99024 POSTOP FOLLOW-UP VISIT: CPT | Performed by: ORTHOPAEDIC SURGERY

## 2021-07-01 RX ORDER — OXYCODONE HYDROCHLORIDE 5 MG/1
5 TABLET ORAL EVERY 6 HOURS PRN
Qty: 6 TABLET | Refills: 0 | Status: SHIPPED | OUTPATIENT
Start: 2021-07-01 | End: 2021-08-20

## 2021-07-01 ASSESSMENT — PAIN SCALES - GENERAL: PAINLEVEL: WORST PAIN (10)

## 2021-07-01 NOTE — LETTER
7/1/2021         RE: Ysabel Diggs  [address]  [place] [address]        Dear Colleague,    Thank you for referring your patient, Ysabel Diggs, to the [facility]. Please see a copy of my visit note below.    DIAGNOSIS:   1. Right chronic ACL tear  2. Medial lateral meniscus tears    PROCEDURES:  1. ACL reconstruction right knee hamstring autograft, medial meniscus repair, partial lateral meniscectomy, chondroplasty; date of surgery 6/25/2021    HISTORY:  Doing well 1 week out from surgery.  Pain controlled largely with still taking opioids.  He would like a refill.  Start physical therapy.    EXAM:     General: Awake, Alert, and oriented. Articulates and communicates with a normal affect     Right lower Extremity:    Incisions well healed without evidence of infection    Normal post-operative effusion and ecchymosis    Range of motion and stability exam not performed    Neurovascularly intact    IMAGING:  Tunnels and hardware in good position following ACL reconstruction    ASSESSMENT:  1. 1 week following ACL reconstruction hamstring autograft    PLAN:     Weightbearing as tolerated with hinged knee brace locked in full extension x6 weeks    Range of motion 0 to 90 degrees    Sutures removed in clinic    Leave steri-strips in place until they fall off    OK to shower allowing water to run over incision    No soaking, scrubbing, baths, or lake for 1 additional week    Continue PT as scheduled     Pain medications reviewed and no refills required.     Operative report provided and arthroscopic images reviewed    Follow up at 6 weeks from the date of surgery with no new X-Rays needed         Again, thank you for allowing me to participate in the care of your patient.        Sincerely,        Devyn Beard MD

## 2021-07-01 NOTE — NURSING NOTE
Ysabel Diggs's chief complaint for this visit includes:  Chief Complaint   Patient presents with     Right Knee - Surgical Followup     1 week S/p Right knee arthroscopy, ACL reconstruction, hamstring autograft, lateral meniscectomy, medial meniscus repair DOS: 06/25/2021     PCP: No Ref-Primary, Physician    Referring Provider:  No referring provider defined for this encounter.    /89   Pulse 87   SpO2 95%   Worst Pain (10)     Do you need any medication refills at today's visit? Oxycodone    Matilde Clarke MA, ATC

## 2021-07-01 NOTE — PROGRESS NOTES
DIAGNOSIS:   1. Right chronic ACL tear  2. Medial lateral meniscus tears    PROCEDURES:  1. ACL reconstruction right knee hamstring autograft, medial meniscus repair, partial lateral meniscectomy, chondroplasty; date of surgery 6/25/2021    HISTORY:  Doing well 1 week out from surgery.  Pain controlled largely with still taking opioids.  He would like a refill.  Start physical therapy.    EXAM:     General: Awake, Alert, and oriented. Articulates and communicates with a normal affect     Right lower Extremity:    Incisions well healed without evidence of infection    Normal post-operative effusion and ecchymosis    Range of motion and stability exam not performed    Neurovascularly intact    IMAGING:  Tunnels and hardware in good position following ACL reconstruction    ASSESSMENT:  1. 1 week following ACL reconstruction hamstring autograft    PLAN:     Weightbearing as tolerated with hinged knee brace locked in full extension x6 weeks    Range of motion 0 to 90 degrees    Sutures removed in clinic    Leave steri-strips in place until they fall off    OK to shower allowing water to run over incision    No soaking, scrubbing, baths, or lake for 1 additional week    Continue PT as scheduled     Pain medications reviewed and no refills required.     Operative report provided and arthroscopic images reviewed    Follow up at 6 weeks from the date of surgery with no new X-Rays needed

## 2021-07-07 ENCOUNTER — THERAPY VISIT (OUTPATIENT)
Dept: PHYSICAL THERAPY | Facility: CLINIC | Age: 41
End: 2021-07-07
Payer: COMMERCIAL

## 2021-07-07 DIAGNOSIS — S83.511A CHRONIC RUPTURE OF ANTERIOR CRUCIATE LIGAMENT OF RIGHT KNEE: ICD-10-CM

## 2021-07-07 DIAGNOSIS — M17.31 POST-TRAUMATIC OSTEOARTHRITIS OF RIGHT KNEE: ICD-10-CM

## 2021-07-07 PROCEDURE — 97112 NEUROMUSCULAR REEDUCATION: CPT | Mod: GP | Performed by: PHYSICAL THERAPIST

## 2021-07-07 PROCEDURE — 97110 THERAPEUTIC EXERCISES: CPT | Mod: GP | Performed by: PHYSICAL THERAPIST

## 2021-07-15 ENCOUNTER — THERAPY VISIT (OUTPATIENT)
Dept: PHYSICAL THERAPY | Facility: CLINIC | Age: 41
End: 2021-07-15
Payer: COMMERCIAL

## 2021-07-15 DIAGNOSIS — M17.31 POST-TRAUMATIC OSTEOARTHRITIS OF RIGHT KNEE: ICD-10-CM

## 2021-07-15 DIAGNOSIS — S83.511A CHRONIC RUPTURE OF ANTERIOR CRUCIATE LIGAMENT OF RIGHT KNEE: ICD-10-CM

## 2021-07-15 PROCEDURE — 97110 THERAPEUTIC EXERCISES: CPT | Mod: GP | Performed by: PHYSICAL THERAPIST

## 2021-07-15 PROCEDURE — 97140 MANUAL THERAPY 1/> REGIONS: CPT | Mod: GP | Performed by: PHYSICAL THERAPIST

## 2021-08-04 ENCOUNTER — THERAPY VISIT (OUTPATIENT)
Dept: PHYSICAL THERAPY | Facility: CLINIC | Age: 41
End: 2021-08-04
Payer: COMMERCIAL

## 2021-08-04 DIAGNOSIS — M17.31 POST-TRAUMATIC OSTEOARTHRITIS OF RIGHT KNEE: ICD-10-CM

## 2021-08-04 DIAGNOSIS — S83.511A CHRONIC RUPTURE OF ANTERIOR CRUCIATE LIGAMENT OF RIGHT KNEE: ICD-10-CM

## 2021-08-04 PROCEDURE — 97140 MANUAL THERAPY 1/> REGIONS: CPT | Mod: GP | Performed by: PHYSICAL THERAPIST

## 2021-08-04 PROCEDURE — 97110 THERAPEUTIC EXERCISES: CPT | Mod: GP | Performed by: PHYSICAL THERAPIST

## 2021-08-04 NOTE — PROGRESS NOTES
PROGRESS  REPORT    Progress reporting period is from 6/30/2021 to 8/4/2021.       SUBJECTIVE  Subjective changes noted by patient: .  Subjective: Patient arrives to PT after 3 week hiatus. Patient states his pain is well managed, and the exercises are going well.      Current Pain level: 0/10.      Initial Pain level: 6/10.   Changes in function:  Yes (See Goal flowsheet attached for changes in current functional level)  Adverse reaction to treatment or activity: None    OBJECTIVE  Changes noted in objective findings:  Yes,   Objective: Despite numerous efforts to get quad to fire strong, it still only fires at a poor-fair quality. R knee PROM: 0-3-85     ASSESSMENT/PLAN  Updated problem list and treatment plan: Diagnosis 1:  S/p R ACL and meniscal repair  Pain -  electric stimulation, manual therapy, splint/taping/bracing/orthotics, self management, education and home program  Decreased ROM/flexibility - manual therapy, therapeutic exercise, therapeutic activity and home program  Decreased joint mobility - manual therapy, therapeutic exercise, therapeutic activity and home program  Decreased strength - therapeutic exercise, therapeutic activities and home program  Decreased function - therapeutic activities and home program  STG/LTGs have been met or progress has been made towards goals:  Yes (See Goal flow sheet completed today.)  Assessment of Progress: The patient's condition has potential to improve.  Self Management Plans:  Patient has been instructed in a home treatment program.  Patient  has been instructed in self management of symptoms.  I have re-evaluated this patient and find that the nature, scope, duration and intensity of the therapy is appropriate for the medical condition of the patient.  Miraf continues to require the following intervention to meet STG and LTG's:  PT    Recommendations:  This patient would benefit from continued therapy.     Frequency:  1 X week, once daily  Duration:  for 12  visits        Please refer to the daily flowsheet for treatment today, total treatment time and time spent performing 1:1 timed codes.

## 2021-08-05 ENCOUNTER — TELEPHONE (OUTPATIENT)
Dept: ORTHOPEDICS | Facility: CLINIC | Age: 41
End: 2021-08-05

## 2021-08-05 ENCOUNTER — OFFICE VISIT (OUTPATIENT)
Dept: ORTHOPEDICS | Facility: CLINIC | Age: 41
End: 2021-08-05
Payer: COMMERCIAL

## 2021-08-05 DIAGNOSIS — G89.29 CHRONIC PAIN OF RIGHT KNEE: Primary | ICD-10-CM

## 2021-08-05 DIAGNOSIS — M25.561 CHRONIC PAIN OF RIGHT KNEE: Primary | ICD-10-CM

## 2021-08-05 PROCEDURE — 99024 POSTOP FOLLOW-UP VISIT: CPT | Performed by: ORTHOPAEDIC SURGERY

## 2021-08-05 NOTE — PROGRESS NOTES
DIAGNOSIS:   1. Right chronic ACL tear  2. Medial lateral meniscus tears    PROCEDURES:  1. ACL reconstruction right knee hamstring autograft, medial meniscus repair, partial lateral meniscectomy, chondroplasty; date of surgery 6/25/2021    HISTORY:  Doing well 6 weeks from the above. Pain controlled, not on opioids. Doing PT. Using brace still    EXAM:     General: Awake, Alert, and oriented. Articulates and communicates with a normal affect     Right lower Extremity:    Incisions well healed without evidence of infection    No post-operative effusion or ecchymosis    Range of motion 0-90 degrees     Stability exam not performed    Neurovascularly intact    IMAGING:  No new imaging    ASSESSMENT:  1. 6 weeks following ACL reconstruction hamstring autograft    PLAN:   Weightbearing: WBAT  Range of Motion: No range of motion restrictions  Pain Medications: We reviewed post-operative pain medications at today's visit. The patient has stopped all opioid pain medications and no further refills are required  Extension: We reviewed the importance of full knee extension and demonstrated the relevant exercises as appropriate  Crutches/Brace: Patient no longer requires the hinged knee brace or crutches.   Acitivity Restrictions:  Discussed that this is the dangerous time after ACL reconstruction  Reviewed activity restrictions at today's visit  Goal to progress strength and motion to allow straight line running at three months from the date of surgery      Follow up: 6 weeks with no new radiographs needed

## 2021-08-05 NOTE — PATIENT INSTRUCTIONS
Thanks for coming today.  [facility]  [address] [place] [address]    To schedule future appointments in Ortho Clinic, you may call [phone].    To schedule ordered imaging by your provider:   Call [facility] [phone]    To schedule an injection ordered by your provider:  Call [facility] Injection scheduling line: [phone]  [email or web address] available online at:  [email or web address]    Please call if any further questions or concerns ([phone]).  Clinic hours 8 am to 5 pm.    Return to clinic (call) if symptoms worsen or fail to improve.

## 2021-08-05 NOTE — NURSING NOTE
Ysabel Diggs's chief complaint for this visit includes:  Chief Complaint   Patient presents with     Surgical Followup     6 weeks S/p Right knee arthroscopy, ACL reconstruction, hamstring autograft, lateral meniscectomy, medial meniscus repair DOS: 06/25/2021     PCP: No Ref-Primary, Physician    Referring Provider:  No referring provider defined for this encounter.    There were no vitals taken for this visit.  Data Unavailable     Do you need any medication refills at today's visit?  No    Matilde Clarke MA, ATC

## 2021-08-05 NOTE — TELEPHONE ENCOUNTER
8/5 Called and left voicemail. Provided phone number [phone] to schedule follow up  in about 6 weeks (around 9/16/2021).  Eloisa springer Procedure   Orthopedics, Podiatry, Sports Medicine, ENT/Eye Specialties  [facility]   [phone]

## 2021-08-05 NOTE — LETTER
8/5/2021         RE: Ysabel Diggs  [address]  [place] [address]        Dear Colleague,    Thank you for referring your patient, Ysabel Diggs, to the [facility]. Please see a copy of my visit note below.    DIAGNOSIS:   1. Right chronic ACL tear  2. Medial lateral meniscus tears    PROCEDURES:  1. ACL reconstruction right knee hamstring autograft, medial meniscus repair, partial lateral meniscectomy, chondroplasty; date of surgery 6/25/2021    HISTORY:  Doing well 6 weeks from the above. Pain controlled, not on opioids. Doing PT. Using brace still    EXAM:     General: Awake, Alert, and oriented. Articulates and communicates with a normal affect     Right lower Extremity:    Incisions well healed without evidence of infection    No post-operative effusion or ecchymosis    Range of motion 0-90 degrees     Stability exam not performed    Neurovascularly intact    IMAGING:  No new imaging    ASSESSMENT:  1. 6 weeks following ACL reconstruction hamstring autograft    PLAN:   Weightbearing: WBAT  Range of Motion: No range of motion restrictions  Pain Medications: We reviewed post-operative pain medications at today's visit. The patient has stopped all opioid pain medications and no further refills are required  Extension: We reviewed the importance of full knee extension and demonstrated the relevant exercises as appropriate  Crutches/Brace: Patient no longer requires the hinged knee brace or crutches.   Acitivity Restrictions:  Discussed that this is the dangerous time after ACL reconstruction  Reviewed activity restrictions at today's visit  Goal to progress strength and motion to allow straight line running at three months from the date of surgery      Follow up: 6 weeks with no new radiographs needed         Again, thank you for allowing me to participate in the care of your patient.        Sincerely,        Devyn Beard MD

## 2021-08-18 ENCOUNTER — THERAPY VISIT (OUTPATIENT)
Dept: PHYSICAL THERAPY | Facility: CLINIC | Age: 41
End: 2021-08-18
Payer: COMMERCIAL

## 2021-08-18 DIAGNOSIS — M17.31 POST-TRAUMATIC OSTEOARTHRITIS OF RIGHT KNEE: ICD-10-CM

## 2021-08-18 DIAGNOSIS — S83.511A CHRONIC RUPTURE OF ANTERIOR CRUCIATE LIGAMENT OF RIGHT KNEE: ICD-10-CM

## 2021-08-18 PROCEDURE — 97110 THERAPEUTIC EXERCISES: CPT | Mod: GP | Performed by: PHYSICAL THERAPIST

## 2021-08-18 PROCEDURE — 97112 NEUROMUSCULAR REEDUCATION: CPT | Mod: GP | Performed by: PHYSICAL THERAPIST

## 2021-08-18 PROCEDURE — 97140 MANUAL THERAPY 1/> REGIONS: CPT | Mod: GP | Performed by: PHYSICAL THERAPIST

## 2021-08-25 ENCOUNTER — THERAPY VISIT (OUTPATIENT)
Dept: PHYSICAL THERAPY | Facility: CLINIC | Age: 41
End: 2021-08-25
Payer: COMMERCIAL

## 2021-08-25 DIAGNOSIS — M17.31 POST-TRAUMATIC OSTEOARTHRITIS OF RIGHT KNEE: ICD-10-CM

## 2021-08-25 DIAGNOSIS — S83.511A CHRONIC RUPTURE OF ANTERIOR CRUCIATE LIGAMENT OF RIGHT KNEE: ICD-10-CM

## 2021-08-25 PROCEDURE — 97140 MANUAL THERAPY 1/> REGIONS: CPT | Mod: GP | Performed by: PHYSICAL THERAPIST

## 2021-08-25 PROCEDURE — 97110 THERAPEUTIC EXERCISES: CPT | Mod: GP | Performed by: PHYSICAL THERAPIST

## 2021-09-08 ENCOUNTER — THERAPY VISIT (OUTPATIENT)
Dept: PHYSICAL THERAPY | Facility: CLINIC | Age: 41
End: 2021-09-08
Payer: COMMERCIAL

## 2021-09-08 DIAGNOSIS — M17.31 POST-TRAUMATIC OSTEOARTHRITIS OF RIGHT KNEE: ICD-10-CM

## 2021-09-08 DIAGNOSIS — S83.511A CHRONIC RUPTURE OF ANTERIOR CRUCIATE LIGAMENT OF RIGHT KNEE: Primary | ICD-10-CM

## 2021-09-08 PROCEDURE — 97530 THERAPEUTIC ACTIVITIES: CPT | Mod: GP | Performed by: PHYSICAL THERAPIST

## 2021-09-08 PROCEDURE — 97110 THERAPEUTIC EXERCISES: CPT | Mod: GP | Performed by: PHYSICAL THERAPIST

## 2021-09-08 PROCEDURE — 97112 NEUROMUSCULAR REEDUCATION: CPT | Mod: GP | Performed by: PHYSICAL THERAPIST

## 2021-09-15 ENCOUNTER — THERAPY VISIT (OUTPATIENT)
Dept: PHYSICAL THERAPY | Facility: CLINIC | Age: 41
End: 2021-09-15
Payer: COMMERCIAL

## 2021-09-15 DIAGNOSIS — M17.31 POST-TRAUMATIC OSTEOARTHRITIS OF RIGHT KNEE: ICD-10-CM

## 2021-09-15 DIAGNOSIS — S83.511A CHRONIC RUPTURE OF ANTERIOR CRUCIATE LIGAMENT OF RIGHT KNEE: ICD-10-CM

## 2021-09-15 PROCEDURE — 97110 THERAPEUTIC EXERCISES: CPT | Mod: GP | Performed by: PHYSICAL THERAPY ASSISTANT

## 2021-09-15 PROCEDURE — 97112 NEUROMUSCULAR REEDUCATION: CPT | Mod: GP | Performed by: PHYSICAL THERAPY ASSISTANT

## 2021-09-15 NOTE — PROGRESS NOTES
Subjective:  HPI  Physical Exam                    Objective:  System    Physical Exam    General     ROS    Assessment/Plan:    PROGRESS  REPORT    Progress reporting period is from 9/8/2021 to 9/15/2021.      SUBJECTIVE     No c/o pain in knee and is mainly stiff. Is getting more confident to do things with leg . Has some mild pain at night but does not interfere with sleep.      Current Pain level: 0/10.      Initial Pain level: 6/10.   Changes in function:  Yes (See Goal flowsheet attached for changes in current functional level)  Adverse reaction to treatment or activity: None    OBJECTIVE    Objective: R knee AAROM 0-0-123. Somewhat poor quad activation with exercises and lacks TKE with supine SLR. Amb with stiff legged gait.      ASSESSMENT/PLAN  Updated problem list and treatment plan: Diagnosis 1:  R ACL reconstruction   STG/LTGs have been met or progress has been made towards goals:  Yes (See Goal flow sheet completed today.)  Assessment of Progress: The patient's condition is improving.  Self Management Plans:  Patient has been instructed in a home treatment program.  Patient  has been instructed in self management of symptoms.    Miraf continues to require the following intervention to meet STG and LTG's:  PT    Recommendations:  This patient would benefit from continued therapy.     Frequency:  1 X week, once daily  Duration:  6-8 weeks         Please refer to the daily flowsheet for treatment today, total treatment time and time spent performing 1:1 timed codes.

## 2021-09-16 ENCOUNTER — OFFICE VISIT (OUTPATIENT)
Dept: ORTHOPEDICS | Facility: CLINIC | Age: 41
End: 2021-09-16
Payer: COMMERCIAL

## 2021-09-16 DIAGNOSIS — G89.29 CHRONIC PAIN OF RIGHT KNEE: Primary | ICD-10-CM

## 2021-09-16 DIAGNOSIS — M25.561 CHRONIC PAIN OF RIGHT KNEE: Primary | ICD-10-CM

## 2021-09-16 PROCEDURE — 99024 POSTOP FOLLOW-UP VISIT: CPT | Performed by: ORTHOPAEDIC SURGERY

## 2021-09-16 NOTE — NURSING NOTE
Reason For Visit:   Chief Complaint   Patient presents with     Surgical Followup     ACL reconstruction right knee hamstring autograft, medial meniscus repair, partial lateral meniscectomy, chondroplasty; DOS: 6/25/2021       ?  No  Occupation not right now.  Currently working? No.  Work status?  Taking time off.  Date of surgery: ACL reconstruction right knee hamstring autograft, medial meniscus repair, partial lateral meniscectomy, chondroplasty; DOS: 6/25/2021  Smoker: No  Request smoking cessation information: No    Sane Score  Right  knee - Affected  Left Knee- 100  Right Knee- 80    No Known Allergies    Matilde Clarke, ATC

## 2021-09-16 NOTE — PATIENT INSTRUCTIONS
Thanks for coming today.  [facility]  [address] [place] [address]    To schedule future appointments in Ortho Clinic, you may call [phone].    To schedule ordered imaging by your provider:   Call Central [facility] [phone]    To schedule an injection ordered by your provider:  Call [facility] Injection scheduling line: [phone]  Guidance Softwarehart available online at:  [email or web address]    Please call if any further questions or concerns ([phone]).  Clinic hours 8 am to 5 pm.    Return to clinic (call) if symptoms worsen or fail to improve.

## 2021-09-16 NOTE — LETTER
9/16/2021         RE: Ysabel Diggs  [address]  [place] [address]        Dear Colleague,    Thank you for referring your patient, Ysabel Diggs, to the [facility]. Please see a copy of my visit note below.    DIAGNOSIS:   1. Right chronic ACL tear  2. Medial lateral meniscus tears    PROCEDURES:  1. ACL reconstruction right knee hamstring autograft, medial meniscus repair, partial lateral meniscectomy, chondroplasty; date of surgery 6/25/2021    HISTORY:  3 months following the above surgery.  Overall pain controlled.  Feels like he is making good progress.  Strength improving.  Not really using the brace anymore.    EXAM:     General: Awake, Alert, and oriented. Articulates and communicates with a normal affect     Right lower Extremity:    Incisions well healed without evidence of infection    No post-operative effusion or ecchymosis    Range of motion shows him to be 0 degrees of full extension versus 2 degrees of hyperextension on the contralateral side in flexion to 115 degrees    Stability exam not performed    Neurovascularly intact    IMAGING:  No new imaging    ASSESSMENT:  1. 12 weeks following ACL reconstruction hamstring autograft, medial meniscus repair, partial lateral meniscectomy    PLAN:   Weightbearing: WBAT  Range of Motion: No range of motion restrictions.   Acitivity Restrictions:  May do straight line running at this time  No running distance or pace restrictions  No cutting, pivoting, or start-stop running  Goal to build strength, endurance, and confidence to allow sports in 3 months time (6 months from the date of surgery)  Brace: Discussed knee bracing options for sports including neoprene knee sleeve ACL functional bracing.   Discussed post-ACL reconstruction therapy program  Follow up: 3 months no XRays with an ACL functional test as completed by physical therapy.               Again, thank you for allowing me to participate in the care  of your patient.        Sincerely,        Devyn Beard MD

## 2021-09-16 NOTE — PROGRESS NOTES
DIAGNOSIS:   1. Right chronic ACL tear  2. Medial lateral meniscus tears    PROCEDURES:  1. ACL reconstruction right knee hamstring autograft, medial meniscus repair, partial lateral meniscectomy, chondroplasty; date of surgery 6/25/2021    HISTORY:  3 months following the above surgery.  Overall pain controlled.  Feels like he is making good progress.  Strength improving.  Not really using the brace anymore.    EXAM:     General: Awake, Alert, and oriented. Articulates and communicates with a normal affect     Right lower Extremity:    Incisions well healed without evidence of infection    No post-operative effusion or ecchymosis    Range of motion shows him to be 0 degrees of full extension versus 2 degrees of hyperextension on the contralateral side in flexion to 115 degrees    Stability exam not performed    Neurovascularly intact    IMAGING:  No new imaging    ASSESSMENT:  1. 12 weeks following ACL reconstruction hamstring autograft, medial meniscus repair, partial lateral meniscectomy    PLAN:   Weightbearing: WBAT  Range of Motion: No range of motion restrictions.   Acitivity Restrictions:  May do straight line running at this time  No running distance or pace restrictions  No cutting, pivoting, or start-stop running  Goal to build strength, endurance, and confidence to allow sports in 3 months time (6 months from the date of surgery)  Brace: Discussed knee bracing options for sports including neoprene knee sleeve ACL functional bracing.   Discussed post-ACL reconstruction therapy program  Follow up: 3 months no XRays with an ACL functional test as completed by physical therapy.

## 2021-09-22 ENCOUNTER — THERAPY VISIT (OUTPATIENT)
Dept: PHYSICAL THERAPY | Facility: CLINIC | Age: 41
End: 2021-09-22
Payer: COMMERCIAL

## 2021-09-22 DIAGNOSIS — S83.511A CHRONIC RUPTURE OF ANTERIOR CRUCIATE LIGAMENT OF RIGHT KNEE: Primary | ICD-10-CM

## 2021-09-22 DIAGNOSIS — M17.31 POST-TRAUMATIC OSTEOARTHRITIS OF RIGHT KNEE: ICD-10-CM

## 2021-09-22 PROCEDURE — 97112 NEUROMUSCULAR REEDUCATION: CPT | Mod: GP | Performed by: PHYSICAL THERAPIST

## 2021-09-22 PROCEDURE — 97110 THERAPEUTIC EXERCISES: CPT | Mod: GP | Performed by: PHYSICAL THERAPIST

## 2021-09-22 PROCEDURE — 97535 SELF CARE MNGMENT TRAINING: CPT | Mod: GP | Performed by: PHYSICAL THERAPIST

## 2021-09-22 PROCEDURE — 97530 THERAPEUTIC ACTIVITIES: CPT | Mod: GP | Performed by: PHYSICAL THERAPIST

## 2021-09-22 NOTE — PROGRESS NOTES
"Daily Note:    SUBJECTIVE:  See Flowsheet    OBJECTIVE:  Amb - Decr knee extension R  AROM:  0-3-122  SLS R EO 50\"              EC  7\"  Poor quad activation w/ exer and ambulation  Can do SLR w/o quad lad with many cues.  Bilat squat - slight wt shift L - can correct w/ cues  SL Squat R - Decr Hip/Knee flex - poor quad activation    ASSESSMENT/PLAN:  See Flowsheet    "

## 2021-09-26 ENCOUNTER — HEALTH MAINTENANCE LETTER (OUTPATIENT)
Age: 41
End: 2021-09-26

## 2021-09-29 ENCOUNTER — THERAPY VISIT (OUTPATIENT)
Dept: PHYSICAL THERAPY | Facility: CLINIC | Age: 41
End: 2021-09-29
Payer: COMMERCIAL

## 2021-09-29 DIAGNOSIS — S83.511A CHRONIC RUPTURE OF ANTERIOR CRUCIATE LIGAMENT OF RIGHT KNEE: Primary | ICD-10-CM

## 2021-09-29 DIAGNOSIS — M17.31 POST-TRAUMATIC OSTEOARTHRITIS OF RIGHT KNEE: ICD-10-CM

## 2021-09-29 PROCEDURE — 97140 MANUAL THERAPY 1/> REGIONS: CPT | Mod: GP | Performed by: PHYSICAL THERAPIST

## 2021-09-29 PROCEDURE — 97112 NEUROMUSCULAR REEDUCATION: CPT | Mod: GP | Performed by: PHYSICAL THERAPIST

## 2021-09-29 PROCEDURE — 97110 THERAPEUTIC EXERCISES: CPT | Mod: GP | Performed by: PHYSICAL THERAPIST

## 2021-09-29 NOTE — PROGRESS NOTES
"Daily Note:    SUBJECTIVE:  See Flowsheet    OBJECTIVE:  Amb - Decr knee extension R  AROM:  0-4-126  Passive flex 130, Passive ext 0  SLS R EO 60\"              EC  8\"  Hip Abd 4+/5  Glut Med 5/5  fair quad activation w/ exer   Can do SLR w/o quad lag with cuing.  Bilat squat - wide SARAH w/ knee translation ant and laterally- corrects w/ cues  SL Squat R - Decr Hip/Knee flex, mild ant knee translation,     ASSESSMENT/PLAN:  See Flowsheet    "

## 2021-10-11 ENCOUNTER — THERAPY VISIT (OUTPATIENT)
Dept: PHYSICAL THERAPY | Facility: CLINIC | Age: 41
End: 2021-10-11
Payer: COMMERCIAL

## 2021-10-11 DIAGNOSIS — M17.31 POST-TRAUMATIC OSTEOARTHRITIS OF RIGHT KNEE: ICD-10-CM

## 2021-10-11 DIAGNOSIS — S83.511A CHRONIC RUPTURE OF ANTERIOR CRUCIATE LIGAMENT OF RIGHT KNEE: ICD-10-CM

## 2021-10-11 PROCEDURE — 97530 THERAPEUTIC ACTIVITIES: CPT | Mod: GP | Performed by: PHYSICAL THERAPY ASSISTANT

## 2021-10-11 PROCEDURE — 97112 NEUROMUSCULAR REEDUCATION: CPT | Mod: GP | Performed by: PHYSICAL THERAPY ASSISTANT

## 2021-10-11 PROCEDURE — 97110 THERAPEUTIC EXERCISES: CPT | Mod: GP | Performed by: PHYSICAL THERAPY ASSISTANT

## 2021-10-11 ASSESSMENT — ENCOUNTER SYMPTOMS
HEMATURIA: 0
DIZZINESS: 0
HEADACHES: 0
FEVER: 0
PARESTHESIAS: 0
EYE PAIN: 0
HEARTBURN: 1
DIARRHEA: 0
SORE THROAT: 0
ABDOMINAL PAIN: 1
HEMATOCHEZIA: 0
JOINT SWELLING: 0
SHORTNESS OF BREATH: 0
ARTHRALGIAS: 0
WEAKNESS: 0
NERVOUS/ANXIOUS: 1
NAUSEA: 0
DYSURIA: 0
MYALGIAS: 0
COUGH: 0
CONSTIPATION: 0
FREQUENCY: 0
PALPITATIONS: 0
CHILLS: 0

## 2021-10-14 ENCOUNTER — OFFICE VISIT (OUTPATIENT)
Dept: FAMILY MEDICINE | Facility: CLINIC | Age: 41
End: 2021-10-14
Payer: COMMERCIAL

## 2021-10-14 VITALS
BODY MASS INDEX: 26.45 KG/M2 | TEMPERATURE: 98 F | WEIGHT: 164.6 LBS | HEIGHT: 66 IN | SYSTOLIC BLOOD PRESSURE: 136 MMHG | OXYGEN SATURATION: 99 % | DIASTOLIC BLOOD PRESSURE: 86 MMHG | HEART RATE: 89 BPM

## 2021-10-14 DIAGNOSIS — Z00.00 ROUTINE GENERAL MEDICAL EXAMINATION AT A HEALTH CARE FACILITY: Primary | ICD-10-CM

## 2021-10-14 DIAGNOSIS — Z23 NEED FOR PROPHYLACTIC VACCINATION AND INOCULATION AGAINST INFLUENZA: ICD-10-CM

## 2021-10-14 DIAGNOSIS — Z13.6 ENCOUNTER FOR LIPID SCREENING FOR CARDIOVASCULAR DISEASE: ICD-10-CM

## 2021-10-14 DIAGNOSIS — K29.50 CHRONIC GASTRITIS WITHOUT BLEEDING, UNSPECIFIED GASTRITIS TYPE: ICD-10-CM

## 2021-10-14 DIAGNOSIS — Z13.220 ENCOUNTER FOR LIPID SCREENING FOR CARDIOVASCULAR DISEASE: ICD-10-CM

## 2021-10-14 LAB
ALBUMIN SERPL-MCNC: 3.9 G/DL (ref 3.4–5)
ALP SERPL-CCNC: 112 U/L (ref 40–150)
ALT SERPL W P-5'-P-CCNC: 112 U/L (ref 0–70)
ANION GAP SERPL CALCULATED.3IONS-SCNC: 6 MMOL/L (ref 3–14)
AST SERPL W P-5'-P-CCNC: 82 U/L (ref 0–45)
BILIRUB SERPL-MCNC: 0.3 MG/DL (ref 0.2–1.3)
BUN SERPL-MCNC: 10 MG/DL (ref 7–30)
CALCIUM SERPL-MCNC: 9.4 MG/DL (ref 8.5–10.1)
CHLORIDE BLD-SCNC: 105 MMOL/L (ref 94–109)
CHOLEST SERPL-MCNC: 249 MG/DL
CO2 SERPL-SCNC: 27 MMOL/L (ref 20–32)
CREAT SERPL-MCNC: 0.68 MG/DL (ref 0.66–1.25)
FASTING STATUS PATIENT QL REPORTED: NO
GFR SERPL CREATININE-BSD FRML MDRD: >90 ML/MIN/1.73M2
GLUCOSE BLD-MCNC: 173 MG/DL (ref 70–99)
HBA1C MFR BLD: 6.8 % (ref 0–5.6)
HDLC SERPL-MCNC: 42 MG/DL
LDLC SERPL CALC-MCNC: 101 MG/DL
LDLC SERPL CALC-MCNC: ABNORMAL MG/DL
NONHDLC SERPL-MCNC: 207 MG/DL
POTASSIUM BLD-SCNC: 4.3 MMOL/L (ref 3.4–5.3)
PROT SERPL-MCNC: 8 G/DL (ref 6.8–8.8)
SODIUM SERPL-SCNC: 138 MMOL/L (ref 133–144)
TRIGL SERPL-MCNC: 688 MG/DL

## 2021-10-14 PROCEDURE — 90686 IIV4 VACC NO PRSV 0.5 ML IM: CPT | Performed by: FAMILY MEDICINE

## 2021-10-14 PROCEDURE — 36415 COLL VENOUS BLD VENIPUNCTURE: CPT | Performed by: FAMILY MEDICINE

## 2021-10-14 PROCEDURE — 83036 HEMOGLOBIN GLYCOSYLATED A1C: CPT | Performed by: FAMILY MEDICINE

## 2021-10-14 PROCEDURE — 99213 OFFICE O/P EST LOW 20 MIN: CPT | Mod: 25 | Performed by: FAMILY MEDICINE

## 2021-10-14 PROCEDURE — 90471 IMMUNIZATION ADMIN: CPT | Performed by: FAMILY MEDICINE

## 2021-10-14 PROCEDURE — 80053 COMPREHEN METABOLIC PANEL: CPT | Performed by: FAMILY MEDICINE

## 2021-10-14 PROCEDURE — 83721 ASSAY OF BLOOD LIPOPROTEIN: CPT | Mod: 59 | Performed by: FAMILY MEDICINE

## 2021-10-14 PROCEDURE — 80061 LIPID PANEL: CPT | Performed by: FAMILY MEDICINE

## 2021-10-14 PROCEDURE — 99396 PREV VISIT EST AGE 40-64: CPT | Mod: 25 | Performed by: FAMILY MEDICINE

## 2021-10-14 RX ORDER — SUCRALFATE 1 G/1
1 TABLET ORAL
Qty: 15 TABLET | Refills: 0 | Status: SHIPPED | OUTPATIENT
Start: 2021-10-14 | End: 2021-10-19

## 2021-10-14 RX ORDER — OMEPRAZOLE 40 MG/1
40 CAPSULE, DELAYED RELEASE ORAL DAILY
Qty: 90 CAPSULE | Refills: 1 | Status: SHIPPED | OUTPATIENT
Start: 2021-10-14

## 2021-10-14 ASSESSMENT — ENCOUNTER SYMPTOMS
ARTHRALGIAS: 0
DYSURIA: 0
COUGH: 0
HEMATURIA: 0
PARESTHESIAS: 0
NERVOUS/ANXIOUS: 1
DIZZINESS: 0
EYE PAIN: 0
WEAKNESS: 0
HEMATOCHEZIA: 0
CONSTIPATION: 0
SORE THROAT: 0
CHILLS: 0
HEARTBURN: 1
MYALGIAS: 0
FREQUENCY: 0
ABDOMINAL PAIN: 1
JOINT SWELLING: 0
HEADACHES: 0
NAUSEA: 0
PALPITATIONS: 0
DIARRHEA: 0
FEVER: 0
SHORTNESS OF BREATH: 0

## 2021-10-14 ASSESSMENT — MIFFLIN-ST. JEOR: SCORE: 1594.37

## 2021-10-14 NOTE — PROGRESS NOTES
SUBJECTIVE:   CC: Ysabel Diggs is an 40 year old male who presents for preventative health visit.   Patient has been advised of split billing requirements and indicates understanding: Yes  Healthy Habits:     Getting at least 3 servings of Calcium per day:  Yes    Bi-annual eye exam:  NO    Dental care twice a year:  Yes    Sleep apnea or symptoms of sleep apnea:  None    Diet:  Regular (no restrictions)    Frequency of exercise:  6-7 days/week    Duration of exercise:  Greater than 60 minutes    Taking medications regularly:  Yes    Barriers to taking medications:  None    Medication side effects:  None    PHQ-2 Total Score: 0    Additional concerns today:  Yes (Gerd)    HIstory of Gerd/H. Pylori gastritis  Has been taking omeprazole  Spicy food still exacerbates symptoms        Today's PHQ-2 Score:   PHQ-2 ( 1999 [employer]) 10/11/2021   Q1: Little interest or pleasure in doing things 0   Q2: Feeling down, depressed or hopeless 0   PHQ-2 Score 0   Q1: Little interest or pleasure in doing things Not at all   Q2: Feeling down, depressed or hopeless Not at all   PHQ-2 Score 0       Abuse: Current or Past(Physical, Sexual or Emotional)- No  Do you feel safe in your environment? Yes    Have you ever done Advance Care Planning? (For example, a Health Directive, POLST, or a discussion with a medical provider or your loved ones about your wishes): No, advance care planning information given to patient to review.  Patient declined advance care planning discussion at this time.    Social History     Tobacco Use     Smoking status: Current Some Day Smoker     Packs/day: 0.50     Types: Cigarettes     Smokeless tobacco: Never Used     Tobacco comment: 10-15 cig a day   Substance Use Topics     Alcohol use: Yes     Comment: [place].     If you drink alcohol do you typically have >3 drinks per day or >7 drinks per week? No    Alcohol Use 10/11/2021   Prescreen: >3 drinks/day or >7 drinks/week? No   Prescreen: >3 drinks/day or >7  "drinks/week? -   No flowsheet data found.    Last PSA: No results found for: PSA    Reviewed orders with patient. Reviewed health maintenance and updated orders accordingly - Yes  BP Readings from Last 3 Encounters:   10/14/21 136/86   07/01/21 129/89   06/25/21 (!) 135/96    Wt Readings from Last 3 Encounters:   10/14/21 74.7 kg (164 lb 9.6 oz)   06/25/21 74.4 kg (164 lb)   06/15/21 74.9 kg (165 lb 0.8 oz)                    Reviewed and updated as needed this visit by clinical staff  Tobacco  Allergies  Meds              Reviewed and updated as needed this visit by Provider                    Review of Systems   Constitutional: Negative for chills and fever.   HENT: Negative for congestion, ear pain, hearing loss and sore throat.    Eyes: Negative for pain and visual disturbance.   Respiratory: Negative for cough and shortness of breath.    Cardiovascular: Negative for chest pain, palpitations and peripheral edema.   Gastrointestinal: Positive for abdominal pain and heartburn. Negative for constipation, diarrhea, hematochezia and nausea.   Genitourinary: Negative for discharge, dysuria, frequency, genital sores, hematuria, impotence and urgency.   Musculoskeletal: Negative for arthralgias, joint swelling and myalgias.   Skin: Negative for rash.   Neurological: Negative for dizziness, weakness, headaches and paresthesias.   Psychiatric/Behavioral: Negative for mood changes. The patient is nervous/anxious.          OBJECTIVE:   /86   Pulse 89   Temp 98  F (36.7  C) (Oral)   Ht 1.676 m (5' 6\")   Wt 74.7 kg (164 lb 9.6 oz)   SpO2 99%   BMI 26.57 kg/m      Physical Exam  GENERAL: healthy, alert and no distress  EYES: Eyes grossly normal to inspection, PERRL and conjunctivae and sclerae normal  HENT: ear canals and TM's normal, nose and mouth without ulcers or lesions  NECK: no adenopathy, no asymmetry, masses, or scars and thyroid normal to palpation  RESP: lungs clear to auscultation - no rales, rhonchi " or wheezes  CV: regular rate and rhythm, normal S1 S2, no S3 or S4, no murmur, click or rub, no peripheral edema and peripheral pulses strong  ABDOMEN: soft, nontender, no hepatosplenomegaly, no masses and bowel sounds normal  MS: no gross musculoskeletal defects noted, no edema  SKIN: no suspicious lesions or rashes  NEURO: Normal strength and tone, mentation intact and speech normal  PSYCH: mentation appears normal, affect normal/bright        ASSESSMENT/PLAN:       ICD-10-CM    1. Routine general medical examination at a health care facility  Z00.00 REVIEW OF HEALTH MAINTENANCE PROTOCOL ORDERS     Comprehensive metabolic panel     Lipid panel reflex to direct LDL Non-fasting     Hemoglobin A1c     Hemoglobin A1c     Lipid panel reflex to direct LDL Non-fasting     Comprehensive metabolic panel   2. Chronic gastritis without bleeding, unspecified gastritis type  K29.50 Adult Gastro Ref - Procedure Only     omeprazole (PRILOSEC) 40 MG DR capsule     sucralfate (CARAFATE) 1 GM tablet   3. Encounter for lipid screening for cardiovascular disease  Z13.220 Lipid panel reflex to direct LDL Non-fasting    Z13.6 Lipid panel reflex to direct LDL Non-fasting   4. BMI 26.0-26.9,adult  Z68.26    5. Need for prophylactic vaccination and inoculation against influenza  Z23 INFLUENZA VACCINE IM > 6 MONTHS VALENT IIV4 (AFLURIA/FLUZONE)     Patient Instructions   Patient Education     Miraf    It was a pleasure seeing you in clinic today.  Here's the plan:    1. GERD/Gastritis - omeprazole 40mg once daily, continue carafate if helpful, EGD procedure ordered to ensure you don't have a stomach ulcer    Let me know if you have questions.    Alex Norton MD    Preventive Health Recommendations  Male Ages 40 to 49    Yearly exam:             See your health care provider every year in order to  o   Review health changes.   o   Discuss preventive care.    o   Review your medicines if your doctor has prescribed any.    You should be  tested each year for STDs (sexually transmitted diseases) if you re at risk.     Have a cholesterol test every 5 years.     Have a colonoscopy (test for colon cancer) if someone in your family has had colon cancer or polyps before age 50.     After age 45, have a diabetes test (fasting glucose). If you are at risk for diabetes, you should have this test every 3 years.      Talk with your health care provider about whether or not a prostate cancer screening test (PSA) is right for you.    Shots: Get a flu shot each year. Get a tetanus shot every 10 years.     Nutrition:    Eat at least 5 servings of fruits and vegetables daily.     Eat whole-grain bread, whole-wheat pasta and brown rice instead of white grains and rice.     Get adequate Calcium and Vitamin D.     Lifestyle    Exercise for at least 150 minutes a week (30 minutes a day, 5 days a week). This will help you control your weight and prevent disease.     Limit alcohol to one drink per day.     No smoking.     Wear sunscreen to prevent skin cancer.     See your dentist every six months for an exam and cleaning.        Healthy Lifestyle   Nutrition and healthy eating: The Mediterranean Diet  Ready to switch to a more heart-healthy diet? Here's how to get started with the Mediterranean diet.  By [facility] Staff   If you're looking for a heart-healthy eating plan, the Mediterranean diet might be right for you.  The Mediterranean diet blends the basics of healthy eating with the traditional flavors and cooking methods of the [place].  Interest in the Mediterranean diet began in the 1960s with the observation that coronary heart disease caused fewer deaths in Mediterranean countries, such as [place] and [place], than in the [place] and northern [place]. Subsequent studies found that the Mediterranean diet is associated with reduced risk factors for cardiovascular disease.  The Mediterranean diet is one of the healthy eating plans recommended by the Dietary  "Guidelines for Americans to promote health and prevent chronic disease.  It is also recognized by the World Health Organization as a healthy and sustainable dietary pattern and as an intangible cultural asset by the United National Educational, Scientific and Cultural Organization.  The Mediterranean diet is a way of eating based on the traditional cuisine of countries bordering the [place] Sea. While there is no single definition of the Mediterranean diet, it is typically high in vegetables, fruits, whole grains, beans, nut and seeds, and olive oil.  The main components of Mediterranean diet include:    Daily consumption of vegetables, fruits, whole grains and healthy fats     Weekly intake of fish, poultry, beans and eggs     Moderate portions of dairy products     Limited intake of red meat  Other important elements of the Mediterranean diet are sharing meals with family and friends, enjoying a glass of red wine and being physically active.  The foundation of the Mediterranean diet is vegetables, fruits, herbs, nuts, beans and whole grains. Meals are built around these plant-based foods. Moderate amounts of dairy, poultry and eggs are also central to the Mediterranean Diet, as is seafood. In contrast, red meat is eaten only occasionally.  Healthy fats are a mainstay of the Mediterranean diet. They're eaten instead of less healthy fats, such as saturated and trans fats, which contribute to heart disease.  Olive oil is the primary source of added fat in the Mediterranean diet. Olive oil provides monounsaturated fat, which has been found to lower total cholesterol and low-density lipoprotein (LDL or \"bad\") cholesterol levels. Nuts and seeds also contain monounsaturated fat.  Fish are also important in the Mediterranean diet. Fatty fish -- such as mackerel, herring, sardines, albacore tuna, salmon and lake trout -- are rich in omega-3 fatty acids, a type of polyunsaturated fat that may reduce inflammation in " the body. Omega-3 fatty acids also help decrease triglycerides, reduce blood clotting, and decrease the risk of stroke and heart failure.  The Mediterranean diet typically allows red wine in moderation. Although alcohol has been associated with a reduced risk of heart disease in some studies, it's by no means risk free. The Dietary Guidelines for Americans caution against beginning to drink or drinking more often on the basis of potential health benefits.  Interested in trying the Mediterranean diet? These tips will help you get started:    Eat more fruits and vegetables. Aim for 7 to 10 servings a day of fruit and vegetables.     Opt for whole grains. Switch to whole-grain bread, cereal and pasta. [place] with other whole grains, such as bulgur and farro.     Use healthy fats. Try olive oil as a replacement for butter when cooking. Instead of putting butter or margarine on bread, try dipping it in flavored olive oil.     Eat more seafood. Eat fish twice a week. Fresh or water-packed tuna, salmon, trout, mackerel and herring are healthy choices. Grilled fish tastes good and requires little cleanup. Avoid deep-fried fish.     Reduce red meat. Substitute fish, poultry or beans for meat. If you eat meat, make sure it's lean and keep portions small.     Enjoy some dairy. Eat low-fat Greek or plain yogurt and small amounts of a variety of cheeses.     Spice it up. Herbs and spices boost flavor and lessen the need for salt.  The Mediterranean diet is a delicious and healthy way to eat. Many people who switch to this style of eating say they'll never eat any other way.                Patient has been advised of split billing requirements and indicates understanding: Yes  COUNSELING:   Reviewed preventive health counseling, as reflected in patient instructions       Regular exercise       Healthy diet/nutrition    Estimated body mass index is 26.57 kg/m  as calculated from the following:    Height as of this encounter:  "1.676 m (5' 6\").    Weight as of this encounter: 74.7 kg (164 lb 9.6 oz).     Weight management plan: Discussed healthy diet and exercise guidelines    He reports that he has been smoking cigarettes. He has been smoking about 0.50 packs per day. He has never used smokeless tobacco.  Tobacco Cessation Action Plan:   Information offered: Patient not interested at this time      Counseling Resources:  ATP IV Guidelines  Pooled Cohorts Equation Calculator  FRAX Risk Assessment  ICSI Preventive Guidelines  Dietary Guidelines for Americans, 2010  USDA's MyPlate  ASA Prophylaxis  Lung CA Screening    Alex Norton MD  [facility]  "

## 2021-10-14 NOTE — PATIENT INSTRUCTIONS
Patient Education     Ysabel    It was a pleasure seeing you in clinic today.  Here's the plan:    1. GERD/Gastritis - omeprazole 40mg once daily, continue carafate if helpful, EGD procedure ordered to ensure you don't have a stomach ulcer    Let me know if you have questions.    Alex Norton MD    Preventive Health Recommendations  Male Ages 40 to 49    Yearly exam:             See your health care provider every year in order to  o   Review health changes.   o   Discuss preventive care.    o   Review your medicines if your doctor has prescribed any.    You should be tested each year for STDs (sexually transmitted diseases) if you re at risk.     Have a cholesterol test every 5 years.     Have a colonoscopy (test for colon cancer) if someone in your family has had colon cancer or polyps before age 50.     After age 45, have a diabetes test (fasting glucose). If you are at risk for diabetes, you should have this test every 3 years.      Talk with your health care provider about whether or not a prostate cancer screening test (PSA) is right for you.    Shots: Get a flu shot each year. Get a tetanus shot every 10 years.     Nutrition:    Eat at least 5 servings of fruits and vegetables daily.     Eat whole-grain bread, whole-wheat pasta and brown rice instead of white grains and rice.     Get adequate Calcium and Vitamin D.     Lifestyle    Exercise for at least 150 minutes a week (30 minutes a day, 5 days a week). This will help you control your weight and prevent disease.     Limit alcohol to one drink per day.     No smoking.     Wear sunscreen to prevent skin cancer.     See your dentist every six months for an exam and cleaning.        Healthy Lifestyle   Nutrition and healthy eating: The Mediterranean Diet  Ready to switch to a more heart-healthy diet? Here's how to get started with the Mediterranean diet.  By [facility] Staff   If you're looking for a heart-healthy eating plan, the Mediterranean diet  might be right for you.  The Mediterranean diet blends the basics of healthy eating with the traditional flavors and cooking methods of the Mediterranean.  Interest in the Mediterranean diet began in the 1960s with the observation that coronary heart disease caused fewer deaths in Mediterranean countries, such as [place] and [place], than in the [place] and northern [place]. Subsequent studies found that the Mediterranean diet is associated with reduced risk factors for cardiovascular disease.  The Mediterranean diet is one of the healthy eating plans recommended by the Dietary Guidelines for Americans to promote health and prevent chronic disease.  It is also recognized by the World Health Organization as a healthy and sustainable dietary pattern and as an intangible cultural asset by the United National Educational, Scientific and Cultural Organization.  The Mediterranean diet is a way of eating based on the traditional cuisine of countries bordering the [place] Sea. While there is no single definition of the Mediterranean diet, it is typically high in vegetables, fruits, whole grains, beans, nut and seeds, and olive oil.  The main components of Mediterranean diet include:    Daily consumption of vegetables, fruits, whole grains and healthy fats     Weekly intake of fish, poultry, beans and eggs     Moderate portions of dairy products     Limited intake of red meat  Other important elements of the Mediterranean diet are sharing meals with family and friends, enjoying a glass of red wine and being physically active.  The foundation of the Mediterranean diet is vegetables, fruits, herbs, nuts, beans and whole grains. Meals are built around these plant-based foods. Moderate amounts of dairy, poultry and eggs are also central to the Mediterranean Diet, as is seafood. In contrast, red meat is eaten only occasionally.  Healthy fats are a mainstay of the Mediterranean diet. They're eaten instead of less healthy fats, such  "as saturated and trans fats, which contribute to heart disease.  Olive oil is the primary source of added fat in the Mediterranean diet. Olive oil provides monounsaturated fat, which has been found to lower total cholesterol and low-density lipoprotein (LDL or \"bad\") cholesterol levels. Nuts and seeds also contain monounsaturated fat.  Fish are also important in the Mediterranean diet. Fatty fish -- such as mackerel, herring, sardines, albacore tuna, salmon and lake trout -- are rich in omega-3 fatty acids, a type of polyunsaturated fat that may reduce inflammation in the body. Omega-3 fatty acids also help decrease triglycerides, reduce blood clotting, and decrease the risk of stroke and heart failure.  The Mediterranean diet typically allows red wine in moderation. Although alcohol has been associated with a reduced risk of heart disease in some studies, it's by no means risk free. The Dietary Guidelines for Americans caution against beginning to drink or drinking more often on the basis of potential health benefits.  Interested in trying the Mediterranean diet? These tips will help you get started:    Eat more fruits and vegetables. Aim for 7 to 10 servings a day of fruit and vegetables.     Opt for whole grains. Switch to whole-grain bread, cereal and pasta. [place] with other whole grains, such as bulgur and farro.     Use healthy fats. Try olive oil as a replacement for butter when cooking. Instead of putting butter or margarine on bread, try dipping it in flavored olive oil.     Eat more seafood. Eat fish twice a week. Fresh or water-packed tuna, salmon, trout, mackerel and herring are healthy choices. Grilled fish tastes good and requires little cleanup. Avoid deep-fried fish.     Reduce red meat. Substitute fish, poultry or beans for meat. If you eat meat, make sure it's lean and keep portions small.     Enjoy some dairy. Eat low-fat Greek or plain yogurt and small amounts of a variety of cheeses. "     Spice it up. Herbs and spices boost flavor and lessen the need for salt.  The Mediterranean diet is a delicious and healthy way to eat. Many people who switch to this style of eating say they'll never eat any other way.

## 2021-10-15 ENCOUNTER — MYC MEDICAL ADVICE (OUTPATIENT)
Dept: FAMILY MEDICINE | Facility: CLINIC | Age: 41
End: 2021-10-15

## 2021-10-18 ENCOUNTER — THERAPY VISIT (OUTPATIENT)
Dept: PHYSICAL THERAPY | Facility: CLINIC | Age: 41
End: 2021-10-18
Payer: COMMERCIAL

## 2021-10-18 DIAGNOSIS — E78.5 HYPERLIPIDEMIA LDL GOAL <100: Primary | ICD-10-CM

## 2021-10-18 DIAGNOSIS — M17.31 POST-TRAUMATIC OSTEOARTHRITIS OF RIGHT KNEE: ICD-10-CM

## 2021-10-18 DIAGNOSIS — S83.511A CHRONIC RUPTURE OF ANTERIOR CRUCIATE LIGAMENT OF RIGHT KNEE: ICD-10-CM

## 2021-10-18 DIAGNOSIS — E11.65 UNCONTROLLED TYPE 2 DIABETES MELLITUS WITH HYPERGLYCEMIA (H): ICD-10-CM

## 2021-10-18 DIAGNOSIS — Z00.00 ROUTINE GENERAL MEDICAL EXAMINATION AT A HEALTH CARE FACILITY: ICD-10-CM

## 2021-10-18 PROCEDURE — 97112 NEUROMUSCULAR REEDUCATION: CPT | Mod: GP | Performed by: PHYSICAL THERAPY ASSISTANT

## 2021-10-18 PROCEDURE — 97530 THERAPEUTIC ACTIVITIES: CPT | Mod: GP | Performed by: PHYSICAL THERAPY ASSISTANT

## 2021-10-18 PROCEDURE — 97110 THERAPEUTIC EXERCISES: CPT | Mod: GP | Performed by: PHYSICAL THERAPY ASSISTANT

## 2021-10-18 RX ORDER — ATORVASTATIN CALCIUM 20 MG/1
20 TABLET, FILM COATED ORAL DAILY
Qty: 90 TABLET | Refills: 0 | Status: SHIPPED | OUTPATIENT
Start: 2021-10-18

## 2021-10-25 ENCOUNTER — THERAPY VISIT (OUTPATIENT)
Dept: PHYSICAL THERAPY | Facility: CLINIC | Age: 41
End: 2021-10-25
Payer: COMMERCIAL

## 2021-10-25 DIAGNOSIS — S83.511A CHRONIC RUPTURE OF ANTERIOR CRUCIATE LIGAMENT OF RIGHT KNEE: ICD-10-CM

## 2021-10-25 DIAGNOSIS — M17.31 POST-TRAUMATIC OSTEOARTHRITIS OF RIGHT KNEE: ICD-10-CM

## 2021-10-25 PROCEDURE — 97112 NEUROMUSCULAR REEDUCATION: CPT | Mod: GP | Performed by: PHYSICAL THERAPY ASSISTANT

## 2021-10-25 PROCEDURE — 97530 THERAPEUTIC ACTIVITIES: CPT | Mod: GP | Performed by: PHYSICAL THERAPY ASSISTANT

## 2021-10-25 PROCEDURE — 97110 THERAPEUTIC EXERCISES: CPT | Mod: GP | Performed by: PHYSICAL THERAPY ASSISTANT

## 2021-10-29 ENCOUNTER — LAB (OUTPATIENT)
Dept: LAB | Facility: CLINIC | Age: 41
End: 2021-10-29
Payer: COMMERCIAL

## 2021-10-29 DIAGNOSIS — E11.65 UNCONTROLLED TYPE 2 DIABETES MELLITUS WITH HYPERGLYCEMIA (H): ICD-10-CM

## 2021-10-29 DIAGNOSIS — Z00.00 ROUTINE GENERAL MEDICAL EXAMINATION AT A HEALTH CARE FACILITY: ICD-10-CM

## 2021-10-29 DIAGNOSIS — E78.5 HYPERLIPIDEMIA LDL GOAL <100: ICD-10-CM

## 2021-10-29 DIAGNOSIS — Z11.59 NEED FOR HEPATITIS C SCREENING TEST: ICD-10-CM

## 2021-10-29 DIAGNOSIS — Z11.4 SCREENING FOR HIV (HUMAN IMMUNODEFICIENCY VIRUS): ICD-10-CM

## 2021-10-29 LAB
HBA1C MFR BLD: 6.7 % (ref 0–5.6)
HCV AB SERPL QL IA: NONREACTIVE
HIV 1+2 AB+HIV1 P24 AG SERPL QL IA: NONREACTIVE

## 2021-10-29 PROCEDURE — 83036 HEMOGLOBIN GLYCOSYLATED A1C: CPT

## 2021-10-29 PROCEDURE — 86803 HEPATITIS C AB TEST: CPT

## 2021-10-29 PROCEDURE — 87389 HIV-1 AG W/HIV-1&-2 AB AG IA: CPT

## 2021-10-29 PROCEDURE — 36415 COLL VENOUS BLD VENIPUNCTURE: CPT

## 2021-10-29 PROCEDURE — 80061 LIPID PANEL: CPT

## 2021-10-29 PROCEDURE — 80053 COMPREHEN METABOLIC PANEL: CPT

## 2021-10-29 PROCEDURE — 82043 UR ALBUMIN QUANTITATIVE: CPT

## 2021-10-30 ENCOUNTER — MYC MEDICAL ADVICE (OUTPATIENT)
Dept: FAMILY MEDICINE | Facility: CLINIC | Age: 41
End: 2021-10-30

## 2021-10-30 LAB
ALBUMIN SERPL-MCNC: 3.9 G/DL (ref 3.4–5)
ALP SERPL-CCNC: 98 U/L (ref 40–150)
ALT SERPL W P-5'-P-CCNC: 85 U/L (ref 0–70)
ANION GAP SERPL CALCULATED.3IONS-SCNC: 10 MMOL/L (ref 3–14)
AST SERPL W P-5'-P-CCNC: 38 U/L (ref 0–45)
BILIRUB SERPL-MCNC: 0.6 MG/DL (ref 0.2–1.3)
BUN SERPL-MCNC: 12 MG/DL (ref 7–30)
CALCIUM SERPL-MCNC: 9.2 MG/DL (ref 8.5–10.1)
CHLORIDE BLD-SCNC: 105 MMOL/L (ref 94–109)
CHOLEST SERPL-MCNC: 178 MG/DL
CO2 SERPL-SCNC: 20 MMOL/L (ref 20–32)
CREAT SERPL-MCNC: 0.82 MG/DL (ref 0.66–1.25)
CREAT UR-MCNC: 362 MG/DL
FASTING STATUS PATIENT QL REPORTED: YES
GFR SERPL CREATININE-BSD FRML MDRD: >90 ML/MIN/1.73M2
GLUCOSE BLD-MCNC: 113 MG/DL (ref 70–99)
HDLC SERPL-MCNC: 51 MG/DL
LDLC SERPL CALC-MCNC: 85 MG/DL
MICROALBUMIN UR-MCNC: 21 MG/L
MICROALBUMIN/CREAT UR: 5.8 MG/G CR (ref 0–17)
NONHDLC SERPL-MCNC: 127 MG/DL
POTASSIUM BLD-SCNC: 3.7 MMOL/L (ref 3.4–5.3)
PROT SERPL-MCNC: 7.9 G/DL (ref 6.8–8.8)
SODIUM SERPL-SCNC: 135 MMOL/L (ref 133–144)
TRIGL SERPL-MCNC: 211 MG/DL

## 2021-11-01 ENCOUNTER — THERAPY VISIT (OUTPATIENT)
Dept: PHYSICAL THERAPY | Facility: CLINIC | Age: 41
End: 2021-11-01
Payer: COMMERCIAL

## 2021-11-01 DIAGNOSIS — S83.511A CHRONIC RUPTURE OF ANTERIOR CRUCIATE LIGAMENT OF RIGHT KNEE: ICD-10-CM

## 2021-11-01 DIAGNOSIS — M17.31 POST-TRAUMATIC OSTEOARTHRITIS OF RIGHT KNEE: ICD-10-CM

## 2021-11-01 PROCEDURE — 97110 THERAPEUTIC EXERCISES: CPT | Mod: GP | Performed by: PHYSICAL THERAPY ASSISTANT

## 2021-11-01 PROCEDURE — 97112 NEUROMUSCULAR REEDUCATION: CPT | Mod: GP | Performed by: PHYSICAL THERAPY ASSISTANT

## 2021-11-01 PROCEDURE — 97530 THERAPEUTIC ACTIVITIES: CPT | Mod: GP | Performed by: PHYSICAL THERAPY ASSISTANT

## 2021-11-01 NOTE — TELEPHONE ENCOUNTER
See other mychart message from patient  FLP was rechecked     Brooks Wolf RN  [facility]

## 2021-11-02 ENCOUNTER — OFFICE VISIT (OUTPATIENT)
Dept: FAMILY MEDICINE | Facility: CLINIC | Age: 41
End: 2021-11-02
Payer: COMMERCIAL

## 2021-11-02 VITALS
WEIGHT: 163 LBS | RESPIRATION RATE: 18 BRPM | HEART RATE: 85 BPM | DIASTOLIC BLOOD PRESSURE: 81 MMHG | OXYGEN SATURATION: 98 % | TEMPERATURE: 97.9 F | SYSTOLIC BLOOD PRESSURE: 117 MMHG | BODY MASS INDEX: 26.31 KG/M2

## 2021-11-02 DIAGNOSIS — Z23 HIGH PRIORITY FOR 2019-NCOV VACCINE: ICD-10-CM

## 2021-11-02 DIAGNOSIS — E11.9 TYPE 2 DIABETES MELLITUS WITHOUT COMPLICATION, WITHOUT LONG-TERM CURRENT USE OF INSULIN (H): ICD-10-CM

## 2021-11-02 DIAGNOSIS — E11.65 UNCONTROLLED TYPE 2 DIABETES MELLITUS WITH HYPERGLYCEMIA (H): Primary | ICD-10-CM

## 2021-11-02 PROCEDURE — 99215 OFFICE O/P EST HI 40 MIN: CPT | Mod: 25 | Performed by: FAMILY MEDICINE

## 2021-11-02 PROCEDURE — 0013A COVID-19,PF,MODERNA (18+ YRS BOOSTER .25ML): CPT | Performed by: FAMILY MEDICINE

## 2021-11-02 PROCEDURE — 90715 TDAP VACCINE 7 YRS/> IM: CPT | Performed by: FAMILY MEDICINE

## 2021-11-02 PROCEDURE — 91301 COVID-19,PF,MODERNA (18+ YRS BOOSTER .25ML): CPT | Performed by: FAMILY MEDICINE

## 2021-11-02 PROCEDURE — 90471 IMMUNIZATION ADMIN: CPT | Performed by: FAMILY MEDICINE

## 2021-11-02 RX ORDER — GLUCOSAMINE HCL/CHONDROITIN SU 500-400 MG
CAPSULE ORAL
Qty: 100 EACH | Refills: 3 | Status: SHIPPED | OUTPATIENT
Start: 2021-11-02 | End: 2023-03-02

## 2021-11-02 RX ORDER — ONDANSETRON 4 MG/1
TABLET, ORALLY DISINTEGRATING ORAL
COMMUNITY
Start: 2021-11-01

## 2021-11-02 RX ORDER — LANCETS
EACH MISCELLANEOUS
Qty: 100 EACH | Refills: 6 | Status: SHIPPED | OUTPATIENT
Start: 2021-11-02 | End: 2023-03-02

## 2021-11-02 ASSESSMENT — PAIN SCALES - GENERAL: PAINLEVEL: NO PAIN (0)

## 2021-11-02 NOTE — PROGRESS NOTES
"  Assessment & Plan       ICD-10-CM    1. Uncontrolled type 2 diabetes mellitus with hyperglycemia (H)  E11.65 TDAP VACCINE (Adacel, Boostrix)  [[id number]]     metFORMIN (GLUCOPHAGE) 500 MG tablet     blood glucose monitoring (NO BRAND SPECIFIED) meter device kit     blood glucose (NO BRAND SPECIFIED) test strip     blood glucose calibration (NO BRAND SPECIFIED) solution     thin (NO BRAND SPECIFIED) lancets     alcohol swab prep pads     AMB Adult Diabetes Educator Referral   2. Type 2 diabetes mellitus without complication, without long-term current use of insulin (H)  E11.9    3. High priority for 2019-nCoV vaccine  Z23 COVID-19,PF,MODERNA (18+ Yrs BOOSTER .25mL)         Review of external notes as documented elsewhere in note  I spent a total of 41 minutes on the day of the visit.   Time spent doing chart review, history and exam, documentation and further activities per the note       Patient Instructions   Ysabel    It was a pleasure seeing you in clinic today.  Here's the plan:    1. Diabetes - A1C 6.7%.  Start by taking metformin once daily for 1 week then increase to twice daily thereafter.  Regular exercise, diet low in processed carbs, high fiber and plant-based protein.  Recheck A1C in 3 months.  Referral to diabetic education.  Continue cholesterol medication.  2. Tobacco use - let me know if you would like help doing this  3. COVID booster shot today    Let me know if you have questions.    Alex Norton MD        Return in about 3 months (around 2/2/2022).    Alex Norton MD  [facility] SAULO Grady is a 41 year old who presents for the following health issues  accompanied by his spouse.    [facility]     ED/UC Followup:    Facility:  [facility]   Date of visit: 10/31/2021  Reason for visit: PUI; \"Patient Story: sob, headache, chills and nausea for several days\"    Current Status: Patient still having mild sob and dizziness and loss of appetite. Denies fever or cough. " Ongoing for about two weeks now.     Negative COVID test.      ER follow-up visit  Workup was unremarkable  CT head done was not helpful    A1C 6.7%  Strong family history of diabetes  Unhealthy diet  No regular exercise due to chronic right knee pain  (+)tobacco use        Review of Systems   Constitutional, HEENT, cardiovascular, pulmonary, gi and gu systems are negative, except as otherwise noted.      Objective    /81   Pulse 85   Temp 97.9  F (36.6  C) (Oral)   Resp 18   Wt 73.9 kg (163 lb)   SpO2 98%   BMI 26.31 kg/m    Body mass index is 26.31 kg/m .  Physical Exam   GENERAL: healthy, alert and no distress  EYES: Eyes grossly normal to inspection, PERRL and conjunctivae and sclerae normal  NECK: no adenopathy, no asymmetry, masses, or scars and thyroid normal to palpation  SKIN: no suspicious lesions or rashes  PSYCH: mentation appears normal, affect normal/bright

## 2021-11-02 NOTE — NURSING NOTE
Prior to immunization administration, verified patients identity using patient s name and date of birth. Please see Immunization Activity for additional information.     Screening Questionnaire for Adult Immunization    Are you sick today?   No   Do you have allergies to medications, food, a vaccine component or latex?   No   Have you ever had a serious reaction after receiving a vaccination?   No   Do you have a long-term health problem with heart, lung, kidney, or metabolic disease (e.g., diabetes), asthma, a blood disorder, no spleen, complement component deficiency, a cochlear implant, or a spinal fluid leak?  Are you on long-term aspirin therapy?   No   Do you have cancer, leukemia, HIV/AIDS, or any other immune system problem?   No   Do you have a parent, brother, or sister with an immune system problem?   No   In the past 3 months, have you taken medications that affect  your immune system, such as prednisone, other steroids, or anticancer drugs; drugs for the treatment of rheumatoid arthritis, Crohn s disease, or psoriasis; or have you had radiation treatments?   No   Have you had a seizure, or a brain or other nervous system problem?   No   During the past year, have you received a transfusion of blood or blood    products, or been given immune (gamma) globulin or antiviral drug?   No   For women: Are you pregnant or is there a chance you could become       pregnant during the next month?   No   Have you received any vaccinations in the past 4 weeks?   No     Immunization questionnaire answers were all negative.        Per orders of Dr. Watson, injection of TDAP given by Abril Garcia CMA. Patient instructed to remain in clinic for 15 minutes afterwards, and to report any adverse reaction to me immediately.       Screening performed by Abril Garcia CMA on 11/2/2021 at 3:03 PM.

## 2021-11-02 NOTE — PATIENT INSTRUCTIONS
Ysabel    It was a pleasure seeing you in clinic today.  Here's the plan:    1. Diabetes - A1C 6.7%.  Start by taking metformin once daily for 1 week then increase to twice daily thereafter.  Regular exercise, diet low in processed carbs, high fiber and plant-based protein.  Recheck A1C in 3 months.  Referral to diabetic education.  Continue cholesterol medication.  2. Tobacco use - let me know if you would like help doing this  3. COVID booster shot today    Let me know if you have questions.    Alex Norton MD

## 2021-11-07 ENCOUNTER — MYC MEDICAL ADVICE (OUTPATIENT)
Dept: FAMILY MEDICINE | Facility: CLINIC | Age: 41
End: 2021-11-07
Payer: COMMERCIAL

## 2021-11-07 DIAGNOSIS — E11.9 TYPE 2 DIABETES MELLITUS WITHOUT COMPLICATION, WITHOUT LONG-TERM CURRENT USE OF INSULIN (H): Primary | ICD-10-CM

## 2021-11-08 ENCOUNTER — THERAPY VISIT (OUTPATIENT)
Dept: PHYSICAL THERAPY | Facility: CLINIC | Age: 41
End: 2021-11-08
Payer: COMMERCIAL

## 2021-11-08 DIAGNOSIS — S83.511A CHRONIC RUPTURE OF ANTERIOR CRUCIATE LIGAMENT OF RIGHT KNEE: Primary | ICD-10-CM

## 2021-11-08 DIAGNOSIS — M17.31 POST-TRAUMATIC OSTEOARTHRITIS OF RIGHT KNEE: ICD-10-CM

## 2021-11-08 PROCEDURE — 97535 SELF CARE MNGMENT TRAINING: CPT | Mod: GP | Performed by: PHYSICAL THERAPIST

## 2021-11-08 PROCEDURE — 97112 NEUROMUSCULAR REEDUCATION: CPT | Mod: GP | Performed by: PHYSICAL THERAPIST

## 2021-11-08 PROCEDURE — 97110 THERAPEUTIC EXERCISES: CPT | Mod: GP | Performed by: PHYSICAL THERAPIST

## 2021-11-08 NOTE — TELEPHONE ENCOUNTER
Routing Mychart to Dr. Watson.  Patient is asking you to order a new accu check device kit.  Order pended if agreeable.    OV on 11/2 for DM.    Chaka Lara RN

## 2021-11-10 ENCOUNTER — ALLIED HEALTH/NURSE VISIT (OUTPATIENT)
Dept: EDUCATION SERVICES | Facility: CLINIC | Age: 41
End: 2021-11-10
Attending: FAMILY MEDICINE
Payer: COMMERCIAL

## 2021-11-10 VITALS — BODY MASS INDEX: 26.21 KG/M2 | WEIGHT: 162.4 LBS

## 2021-11-10 DIAGNOSIS — E11.65 UNCONTROLLED TYPE 2 DIABETES MELLITUS WITH HYPERGLYCEMIA (H): ICD-10-CM

## 2021-11-10 PROCEDURE — G0108 DIAB MANAGE TRN  PER INDIV: HCPCS

## 2021-11-10 RX ORDER — BLOOD-GLUCOSE METER
1 EACH MISCELLANEOUS DAILY
Qty: 1 KIT | Refills: 0 | Status: SHIPPED | OUTPATIENT
Start: 2021-11-10

## 2021-11-10 NOTE — LETTER
"    11/10/2021         RE: Ysabel Diggs  [address]  [place] [address]        Dear Colleague,    Thank you for referring your patient, Ysabel Diggs, to the [facility]. Please see a copy of my visit note below.    Diabetes Self-Management Education & Support    Presents for: Initial Assessment for new diagnosis    SUBJECTIVE/OBJECTIVE:  Presents for: Initial Assessment for new diagnosis  Accompanied by: Self  Diabetes education in the past 24mo: No  Diabetes type: Type 2  Date of diagnosis: 10/29/21  Disease course: Stable  Diabetes management related comments/concerns: Says sometimes gets light-headed if he does not eat within 4-6 hours.  Notices this throughout the day.  Says lost 3 lbs since cutting back on ghee, fats and noodles.  Says cut out the raw beef and cuttign back on red meat and eating more fish- mackeral, sardines.    Wanted to know if they can get another meter- lost the meter that was provided.      Other concerns:: None  Cultural Influences/Ethnic Background:  Niuean    Diabetes Symptoms & Complications:  Fatigue: No  Neuropathy: No  Polydipsia: No  Polyphagia: No  Polyuria: No  Visual change: No  Slow healing wounds: No  Complications assessed today?: Yes  Autonomic neuropathy: No  CVA: No  Heart disease: No  Nephropathy: No  Peripheral neuropathy: No  Peripheral Vascular Disease: No  Retinopathy: No  Sexual dysfunction: No    Patient Problem List and Family Medical History reviewed for relevant medical history, current medical status, and diabetes risk factors.    Vitals:  Wt 73.7 kg (162 lb 6.4 oz)   BMI 26.21 kg/m    Estimated body mass index is 26.21 kg/m  as calculated from the following:    Height as of 10/14/21: 1.676 m (5' 6\").    Weight as of this encounter: 73.7 kg (162 lb 6.4 oz).   Last 3 BP:   BP Readings from Last 3 Encounters:   11/02/21 117/81   10/14/21 136/86   07/01/21 129/89       History   Smoking Status     Current Some " Day Smoker     Packs/day: 0.50     Types: Cigarettes   Smokeless Tobacco     Never Used     Comment: 10-15 cig a day       Labs:  Lab Results   Component Value Date    A1C 6.7 10/29/2021    A1C 5.5 09/02/2015     Lab Results   Component Value Date     10/29/2021     04/08/2021     Lab Results   Component Value Date    LDL 85 10/29/2021     10/14/2021     04/26/2018     HDL Cholesterol   Date Value Ref Range Status   04/26/2018 53 >39 mg/dL Final     Direct Measure HDL   Date Value Ref Range Status   10/29/2021 51 >=40 mg/dL Final   ]  GFR Estimate   Date Value Ref Range Status   10/29/2021 >90 >60 mL/min/1.73m2 Final     Comment:     As of July 11, 2021, eGFR is calculated by the CKD-EPI creatinine equation, without race adjustment. eGFR can be influenced by muscle mass, exercise, and diet. The reported eGFR is an estimation only and is only applicable if the renal function is stable.   04/08/2021 >90 >60 mL/min/[1.73_m2] Final     Comment:     Non  GFR Calc  Starting 12/18/2018, serum creatinine based estimated GFR (eGFR) will be   calculated using the Chronic Kidney Disease Epidemiology Collaboration   (CKD-EPI) equation.       GFR Estimate If Black   Date Value Ref Range Status   04/08/2021 >90 >60 mL/min/[1.73_m2] Final     Comment:      GFR Calc  Starting 12/18/2018, serum creatinine based estimated GFR (eGFR) will be   calculated using the Chronic Kidney Disease Epidemiology Collaboration   (CKD-EPI) equation.       Lab Results   Component Value Date    CR 0.82 10/29/2021    CR 0.72 04/08/2021     No results found for: MICROALBUMIN    Healthy Eating:  Healthy Eating Assessed Today: Yes  Cultural/Church diet restrictions?: Yes (No Pork)  Meal planning/habits: Avoiding sweets,Heart healthy,Low salt,Other  How many times a week on average do you eat food made away from home (restaurant/take-out)?: 0  Meals include: Breakfast,Dinner,Afternoon  Snack  Breakfast: Wakes: 11am: 1 cup milk  Lunch: 1pm: sardines with tomato, pepper and onion in sunflower oil with 1 Injera (Teff)  Dinner: 6-11pm: 1-1.5 Injera, roberth greens with beef, garbanzo bean stew, lentils with onions, tomatoes, olive oil but ghee with spices (1-2 tsp) OR chicken nugget with breading (gluten free) and ranch dressing  Snacks: PM: blue corn chips and green chili salsa OR veggie flaxseed chips  and salsa  Other: None -stopped ice-cream and sweets  Beverages: Water,Tea,Milk,Alcohol (2x/week for hard selzer/beer, tea in the morning, water all day.  Gave up soda.)  Has patient met with a dietitian in the past?: No    Being Active:  Being Active Assessed Today: Yes  Exercise:: Yes  Days per week of moderate to strenuous exercise (like a brisk walk): 7 (walking 2.5 miles.  had knee surgery 4 months ago.  was doing 6-7 miles of walking and 10 miles of biking last summer)  On average, minutes per day of exercise at this level: 50  How intense was your typical exercise? : Moderate (like brisk walking)  Exercise Minutes per Week: 350  Barrier to exercise: Physical limitation    Monitoring:  Monitoring Assessed Today: Yes  Did patient bring glucose meter to appointment? : No (Lost meter - waiting for new one.)  Blood Glucose Meter: Accu-chek (Guide Me)  Times checking blood sugar at home (number): 1  Times checking blood sugar at home (per): Week  Blood glucose trend: Other    Blood glucose: Remembers one blood glucose was 91 mg/dL before eating. Lost meter and do not recall other values.    Taking Medications:  Diabetes Medication(s)     Biguanides       metFORMIN (GLUCOPHAGE) 500 MG tablet    Take 1 tablet (500 mg) by mouth 2 times daily (with meals)          Taking Medication Assessed Today: Yes  Current Treatments: Diet,Oral Medication (taken by mouth)  Problems taking diabetes medications regularly?: No (Says was told to start with 1 tablet and is going up to 2 today.)  Diabetes medication side  effects?: No    Problem Solving:  Problem Solving Assessed Today: Yes  Is the patient at risk for hypoglycemia?: No              Reducing Risks:  Reducing Risks Assessed Today: No  Diabetes Risks: Family History (Dad, brothers and sister and uncles)  CAD Risks: Diabetes Mellitus,Stress,Tobacco exposure  Has dilated eye exam at least once a year?: No  Sees dentist every 6 months?: Yes  Feet checked by healthcare provider in the last year?: No    Healthy Coping:  Healthy Coping Assessed Today: No  Emotional response to diabetes: Ready to learn,Concern for health and well-being,Acceptance  Informal Support system:: Family,Significant other  Stage of change: ACTION (Actively working towards change)  Patient Activation Measure Survey Score:  MARTA Score (Last Two) 9/2/2015   MARTA Raw Score 39   Activation Score 56.4   MARTA Level 3       Diabetes knowledge and skills assessment:   Patient is knowledgeable in diabetes management concepts related to: Healthy Eating and Taking Medication    Patient needs further education on the following diabetes management concepts: Healthy Eating, Being Active, Monitoring, Taking Medication, Problem Solving, Reducing Risks and Healthy Coping    Based on learning assessment above, most appropriate setting for further diabetes education would be: Group class or Individual setting.      INTERVENTIONS:    Education provided today on:  AADE Self-Care Behaviors:  Diabetes Pathophysiology  Healthy Eating: carbohydrate counting, consistency in amount, composition, and timing of food intake, weight reduction and portion control  Being Active: relationship to blood glucose and describe appropriate activity program  Monitoring: purpose, log and interpret results, individual blood glucose targets, frequency of monitoring and proper sharps disposal  Taking Medication: action of prescribed medication, side effects of prescribed medications and when to take medications  Problem Solving: high blood glucose -  causes, signs/symptoms, treatment and prevention and low blood glucose - causes, signs/symptoms, treatment and prevention    Opportunities for ongoing education and support in diabetes-self management were discussed.    Pt verbalized understanding of concepts discussed and recommendations provided today.       Education Materials Provided:  [employer] Understanding Diabetes Booklet, Safe Disposal Options for Needles & Syringes, BG Log Sheet and Accu-Chek Guide Me meter kit      ASSESSMENT:  Discussed physiology of diabetes, difference between Type 1 and 2, alternating checking times to help identify patterns, how often to check blood glucose, what the A1C means and reviewed target blood glucose levels.  Reviewed benefits of being active and encouraged Miraf to continue daily activity as tolerated since can help with better blood glucose utilization.      Reviewed carbohydrate sources and instructed on carbohydrate counting and label reading.  Provided general recommendations for him to consume 45-60 gm carbs at meals and limit snacks to 15-30 grams.  Cautioned against under-consuming carbs since may cause liver to produce glucose, leading to erratic blood glucose levels.  Used food models to help demonstrate portion control.  Patient to receive additional education when returns for follow up.  Pt seems receptive and verbalized understanding of concepts discussed and recommendations provided.          Patient's most recent   Lab Results   Component Value Date    A1C 6.7 10/29/2021    A1C 5.5 09/02/2015    is meeting goal of <7.0    PLAN  See Patient Instructions for co-developed, patient-stated behavior change goals.  AVS printed and provided to patient today. See Follow-Up section for recommended follow-up.    Delfina Harrell RDN, LD, CDCES   Time Spent: 75 minutes  Encounter Type: Individual    Any diabetes medication dose changes were made via the CDE Protocol and Collaborative Practice Agreement with the  patient's referring provider. A copy of this encounter was shared with the provider.

## 2021-11-10 NOTE — Clinical Note
Hello,     I met with Ysabel Diggs for a diabetes education visit today. Ongoing plan for education and support: Follow-up visit with diabetes educator in 1 month. If there are any questions or recommended changes to the patient's diabetes care plan in the future, I will be in touch. Thank you for your referral!     (This communication is being sent to you in order to comply with our American Association of Diabetes Educators accreditation standards.)     Delfina Harrell RDN, LD, [facility]

## 2021-11-10 NOTE — PROGRESS NOTES
"Diabetes Self-Management Education & Support    Presents for: Initial Assessment for new diagnosis    SUBJECTIVE/OBJECTIVE:  Presents for: Initial Assessment for new diagnosis  Accompanied by: Self  Diabetes education in the past 24mo: No  Diabetes type: Type 2  Date of diagnosis: 10/29/21  Disease course: Stable  Diabetes management related comments/concerns: Says sometimes gets light-headed if he does not eat within 4-6 hours.  Notices this throughout the day.  Says lost 3 lbs since cutting back on ghee, fats and noodles.  Says cut out the raw beef and cuttign back on red meat and eating more fish- mackeral, sardines.    Wanted to know if they can get another meter- lost the meter that was provided.      Other concerns:: None  Cultural Influences/Ethnic Background:  German    Diabetes Symptoms & Complications:  Fatigue: No  Neuropathy: No  Polydipsia: No  Polyphagia: No  Polyuria: No  Visual change: No  Slow healing wounds: No  Complications assessed today?: Yes  Autonomic neuropathy: No  CVA: No  Heart disease: No  Nephropathy: No  Peripheral neuropathy: No  Peripheral Vascular Disease: No  Retinopathy: No  Sexual dysfunction: No    Patient Problem List and Family Medical History reviewed for relevant medical history, current medical status, and diabetes risk factors.    Vitals:  Wt 73.7 kg (162 lb 6.4 oz)   BMI 26.21 kg/m    Estimated body mass index is 26.21 kg/m  as calculated from the following:    Height as of 10/14/21: 1.676 m (5' 6\").    Weight as of this encounter: 73.7 kg (162 lb 6.4 oz).   Last 3 BP:   BP Readings from Last 3 Encounters:   11/02/21 117/81   10/14/21 136/86   07/01/21 129/89       History   Smoking Status     Current Some Day Smoker     Packs/day: 0.50     Types: Cigarettes   Smokeless Tobacco     Never Used     Comment: 10-15 cig a day       Labs:  Lab Results   Component Value Date    A1C 6.7 10/29/2021    A1C 5.5 09/02/2015     Lab Results   Component Value Date     " 10/29/2021     04/08/2021     Lab Results   Component Value Date    LDL 85 10/29/2021     10/14/2021     04/26/2018     HDL Cholesterol   Date Value Ref Range Status   04/26/2018 53 >39 mg/dL Final     Direct Measure HDL   Date Value Ref Range Status   10/29/2021 51 >=40 mg/dL Final   ]  GFR Estimate   Date Value Ref Range Status   10/29/2021 >90 >60 mL/min/1.73m2 Final     Comment:     As of July 11, 2021, eGFR is calculated by the CKD-EPI creatinine equation, without race adjustment. eGFR can be influenced by muscle mass, exercise, and diet. The reported eGFR is an estimation only and is only applicable if the renal function is stable.   04/08/2021 >90 >60 mL/min/[1.73_m2] Final     Comment:     Non  GFR Calc  Starting 12/18/2018, serum creatinine based estimated GFR (eGFR) will be   calculated using the Chronic Kidney Disease Epidemiology Collaboration   (CKD-EPI) equation.       GFR Estimate If Black   Date Value Ref Range Status   04/08/2021 >90 >60 mL/min/[1.73_m2] Final     Comment:      GFR Calc  Starting 12/18/2018, serum creatinine based estimated GFR (eGFR) will be   calculated using the Chronic Kidney Disease Epidemiology Collaboration   (CKD-EPI) equation.       Lab Results   Component Value Date    CR 0.82 10/29/2021    CR 0.72 04/08/2021     No results found for: MICROALBUMIN    Healthy Eating:  Healthy Eating Assessed Today: Yes  Cultural/Islam diet restrictions?: Yes (No Pork)  Meal planning/habits: Avoiding sweets,Heart healthy,Low salt,Other  How many times a week on average do you eat food made away from home (restaurant/take-out)?: 0  Meals include: Breakfast,Dinner,Afternoon Snack  Breakfast: Wakes: 11am: 1 cup milk  Lunch: 1pm: sardines with tomato, pepper and onion in sunflower oil with 1 Injera (Teff)  Dinner: 6-11pm: 1-1.5 Injera, roberth greens with beef, garbanzo bean stew, lentils with onions, tomatoes, olive oil but ghee with  spices (1-2 tsp) OR chicken nugget with breading (gluten free) and ranch dressing  Snacks: PM: blue corn chips and green chili salsa OR veggie flaxseed chips  and salsa  Other: None -stopped ice-cream and sweets  Beverages: Water,Tea,Milk,Alcohol (2x/week for hard selzer/beer, tea in the morning, water all day.  Gave up soda.)  Has patient met with a dietitian in the past?: No    Being Active:  Being Active Assessed Today: Yes  Exercise:: Yes  Days per week of moderate to strenuous exercise (like a brisk walk): 7 (walking 2.5 miles.  had knee surgery 4 months ago.  was doing 6-7 miles of walking and 10 miles of biking last summer)  On average, minutes per day of exercise at this level: 50  How intense was your typical exercise? : Moderate (like brisk walking)  Exercise Minutes per Week: 350  Barrier to exercise: Physical limitation    Monitoring:  Monitoring Assessed Today: Yes  Did patient bring glucose meter to appointment? : No (Lost meter - waiting for new one.)  Blood Glucose Meter: Accu-chek (Guide Me)  Times checking blood sugar at home (number): 1  Times checking blood sugar at home (per): Week  Blood glucose trend: Other    Blood glucose: Remembers one blood glucose was 91 mg/dL before eating. Lost meter and do not recall other values.    Taking Medications:  Diabetes Medication(s)     Biguanides       metFORMIN (GLUCOPHAGE) 500 MG tablet    Take 1 tablet (500 mg) by mouth 2 times daily (with meals)          Taking Medication Assessed Today: Yes  Current Treatments: Diet,Oral Medication (taken by mouth)  Problems taking diabetes medications regularly?: No (Says was told to start with 1 tablet and is going up to 2 today.)  Diabetes medication side effects?: No    Problem Solving:  Problem Solving Assessed Today: Yes  Is the patient at risk for hypoglycemia?: No              Reducing Risks:  Reducing Risks Assessed Today: No  Diabetes Risks: Family History (Dad, brothers and sister and uncles)  CAD Risks:  Diabetes Mellitus,Stress,Tobacco exposure  Has dilated eye exam at least once a year?: No  Sees dentist every 6 months?: Yes  Feet checked by healthcare provider in the last year?: No    Healthy Coping:  Healthy Coping Assessed Today: No  Emotional response to diabetes: Ready to learn,Concern for health and well-being,Acceptance  Informal Support system:: Family,Significant other  Stage of change: ACTION (Actively working towards change)  Patient Activation Measure Survey Score:  MARTA Score (Last Two) 9/2/2015   MARTA Raw Score 39   Activation Score 56.4   MARTA Level 3       Diabetes knowledge and skills assessment:   Patient is knowledgeable in diabetes management concepts related to: Healthy Eating and Taking Medication    Patient needs further education on the following diabetes management concepts: Healthy Eating, Being Active, Monitoring, Taking Medication, Problem Solving, Reducing Risks and Healthy Coping    Based on learning assessment above, most appropriate setting for further diabetes education would be: Group class or Individual setting.      INTERVENTIONS:    Education provided today on:  AADE Self-Care Behaviors:  Diabetes Pathophysiology  Healthy Eating: carbohydrate counting, consistency in amount, composition, and timing of food intake, weight reduction and portion control  Being Active: relationship to blood glucose and describe appropriate activity program  Monitoring: purpose, log and interpret results, individual blood glucose targets, frequency of monitoring and proper sharps disposal  Taking Medication: action of prescribed medication, side effects of prescribed medications and when to take medications  Problem Solving: high blood glucose - causes, signs/symptoms, treatment and prevention and low blood glucose - causes, signs/symptoms, treatment and prevention    Opportunities for ongoing education and support in diabetes-self management were discussed.    Pt verbalized understanding of concepts  discussed and recommendations provided today.       Education Materials Provided:  [facility] Understanding Diabetes Booklet, Safe Disposal Options for Needles & Syringes, BG Log Sheet and Accu-Chek Guide Me meter kit      ASSESSMENT:  Discussed physiology of diabetes, difference between Type 1 and 2, alternating checking times to help identify patterns, how often to check blood glucose, what the A1C means and reviewed target blood glucose levels.  Reviewed benefits of being active and encouraged Miraf to continue daily activity as tolerated since can help with better blood glucose utilization.      Reviewed carbohydrate sources and instructed on carbohydrate counting and label reading.  Provided general recommendations for him to consume 45-60 gm carbs at meals and limit snacks to 15-30 grams.  Cautioned against under-consuming carbs since may cause liver to produce glucose, leading to erratic blood glucose levels.  Used food models to help demonstrate portion control.  Patient to receive additional education when returns for follow up.  Pt seems receptive and verbalized understanding of concepts discussed and recommendations provided.          Patient's most recent   Lab Results   Component Value Date    A1C 6.7 10/29/2021    A1C 5.5 09/02/2015    is meeting goal of <7.0    PLAN  See Patient Instructions for co-developed, patient-stated behavior change goals.  AVS printed and provided to patient today. See Follow-Up section for recommended follow-up (12/15/21).    Delfina Harrell RDN, TONG, KWAME   Time Spent: 75 minutes  Encounter Type: Individual    Any diabetes medication dose changes were made via the CDE Protocol and Collaborative Practice Agreement with the patient's referring provider. A copy of this encounter was shared with the provider.

## 2021-11-10 NOTE — PATIENT INSTRUCTIONS
1. Check blood glucose 3-7 times a week.  Good idea to vary times and try to get a morning (before breakfast) and evening (before or 2 hours after start of dinner or before bed) during the week.  Target blood glucose before meals:  mg/dL  2 hours after start of meal: <180 mg/dL  Before bed: <150 mg/dL    2. Aim for 45-60 gm carbohydrate at meals and limit snacks to 15-30 gm carbohydrates    OR     Plate method:  1/2 plate non-starchy veggies  1/4 plate lean meat  1/4 plate grains (1/2-1 cup OR 1-2 slices bread)    Helpful Website: CalorieKing.com    3. Being active helps to lower blood glucose so keep walking daily!    FOLLOW UP APPOINTMENT: In person follow up on Wednesday, December 15th at 2:30pm at [facility].     Delfina Harrell RDN, LD, [facility]   [phone]

## 2021-12-06 ENCOUNTER — THERAPY VISIT (OUTPATIENT)
Dept: PHYSICAL THERAPY | Facility: CLINIC | Age: 41
End: 2021-12-06
Payer: COMMERCIAL

## 2021-12-06 DIAGNOSIS — M17.31 POST-TRAUMATIC OSTEOARTHRITIS OF RIGHT KNEE: Primary | ICD-10-CM

## 2021-12-06 DIAGNOSIS — S83.511A CHRONIC RUPTURE OF ANTERIOR CRUCIATE LIGAMENT OF RIGHT KNEE: ICD-10-CM

## 2021-12-06 PROCEDURE — 97110 THERAPEUTIC EXERCISES: CPT | Mod: GP | Performed by: PHYSICAL THERAPIST

## 2021-12-06 PROCEDURE — 97530 THERAPEUTIC ACTIVITIES: CPT | Mod: GP | Performed by: PHYSICAL THERAPIST

## 2021-12-06 PROCEDURE — 97140 MANUAL THERAPY 1/> REGIONS: CPT | Mod: GP | Performed by: PHYSICAL THERAPIST

## 2021-12-06 PROCEDURE — 97112 NEUROMUSCULAR REEDUCATION: CPT | Mod: GP | Performed by: PHYSICAL THERAPIST

## 2021-12-06 NOTE — PROGRESS NOTES
Stdg knee ext R slight decr ext/L significant hyperextension.  Supine knee ext active 6  Supine knee ext Passive 2  SL Squat - R significantly decr depth and min medial knee deviation.

## 2021-12-08 ENCOUNTER — OFFICE VISIT (OUTPATIENT)
Dept: OPTOMETRY | Facility: CLINIC | Age: 41
End: 2021-12-08
Payer: COMMERCIAL

## 2021-12-08 DIAGNOSIS — H52.11 MYOPIA, RIGHT: ICD-10-CM

## 2021-12-08 DIAGNOSIS — E11.9 TYPE 2 DIABETES MELLITUS WITHOUT RETINOPATHY (H): ICD-10-CM

## 2021-12-08 DIAGNOSIS — Z01.01 ENCOUNTER FOR EXAMINATION OF EYES AND VISION WITH ABNORMAL FINDINGS: Primary | ICD-10-CM

## 2021-12-08 PROCEDURE — 92004 COMPRE OPH EXAM NEW PT 1/>: CPT | Performed by: OPTOMETRIST

## 2021-12-08 PROCEDURE — 92015 DETERMINE REFRACTIVE STATE: CPT | Performed by: OPTOMETRIST

## 2021-12-08 ASSESSMENT — SLIT LAMP EXAM - LIDS
COMMENTS: NORMAL
COMMENTS: NORMAL

## 2021-12-08 ASSESSMENT — CUP TO DISC RATIO
OD_RATIO: 0.3
OS_RATIO: 0.3

## 2021-12-08 ASSESSMENT — REFRACTION_MANIFEST
OS_CYLINDER: SPHERE
OD_CYLINDER: SPHERE
OD_SPHERE: -0.25
OS_SPHERE: PLANO

## 2021-12-08 ASSESSMENT — EXTERNAL EXAM - RIGHT EYE: OD_EXAM: NORMAL

## 2021-12-08 ASSESSMENT — VISUAL ACUITY
METHOD: SNELLEN - LINEAR
OD_SC: 20/20
OS_SC: 20/40
OS_SC: 20/25
OD_SC: 20/25

## 2021-12-08 ASSESSMENT — TONOMETRY
OD_IOP_MMHG: 18
IOP_METHOD: APPLANATION
OS_IOP_MMHG: 17

## 2021-12-08 ASSESSMENT — EXTERNAL EXAM - LEFT EYE: OS_EXAM: NORMAL

## 2021-12-08 ASSESSMENT — CONF VISUAL FIELD
OD_NORMAL: 1
OS_NORMAL: 1

## 2021-12-08 NOTE — PROGRESS NOTES
"Chief Complaint   Patient presents with     Diabetic Eye Exam      Lab Results   Component Value Date    A1C 6.7 10/29/2021    A1C 6.8 10/14/2021    A1C 5.5 09/02/2015       Last Eye Exam: Patient first eye exam    Dilated Previously: yes, side effects of dilation explained today    What are you currently using to see?  does not use glasses or contacts       Distance Vision Acuity: Satisfied with vision    Near Vision Acuity: Satisfied with vision while reading  unaided    Eye Comfort: dry and watery  Do you use eye drops? : Yes: OTC Clear eyes  Occupation or Hobbies: self employed (deliveries)     Medical, surgical and family histories reviewed and updated 12/8/2021.       OBJECTIVE: See Ophthalmology exam    ASSESSMENT:    ICD-10-CM    1. Encounter for examination of eyes and vision with abnormal findings  Z01.01    2. Type 2 diabetes mellitus without retinopathy (H)  E11.9    3. Myopia, right  H52.11       PLAN:     Patient Instructions   Patient educated on importance of good blood sugar control.  Letter sent to primary care provider with diabetic eye exam report.     I recommend using artificial tears for your dry eye. There are over the counter drops that work well and may be used up to 4x daily (Systane , Refresh, Thera Tears)- avoid \"get the red out\" drops.     No glasses prescription necessary at this time.     Return in 1 year for a comprehensive eye exam, or sooner if needed.     The effects of the dilating drops last for 4- 6 hours.  You will be more sensitive to light and vision will be blurry up close.  Mydriatic sunglasses were given if needed.    Keyshawn Gutierrez, OD  [facility] [place]  [place]  [address] [phone]       "

## 2021-12-08 NOTE — LETTER
"    12/8/2021         RE: Ysabel Diggs  [address]  [place] [address]        Dear Colleague,    Thank you for referring your patient, Ysabel Diggs, to the [facility]. Please see a copy of my visit note below.    Chief Complaint   Patient presents with     Diabetic Eye Exam      Lab Results   Component Value Date    A1C 6.7 10/29/2021    A1C 6.8 10/14/2021    A1C 5.5 09/02/2015       Last Eye Exam: Patient first eye exam    Dilated Previously: yes, side effects of dilation explained today    What are you currently using to see?  does not use glasses or contacts       Distance Vision Acuity: Satisfied with vision    Near Vision Acuity: Satisfied with vision while reading  unaided    Eye Comfort: dry and watery  Do you use eye drops? : Yes: OTC Clear eyes  Occupation or Hobbies: self employed (deliveries)     Medical, surgical and family histories reviewed and updated 12/8/2021.       OBJECTIVE: See Ophthalmology exam    ASSESSMENT:    ICD-10-CM    1. Encounter for examination of eyes and vision with abnormal findings  Z01.01    2. Type 2 diabetes mellitus without retinopathy (H)  E11.9    3. Myopia, right  H52.11       PLAN:     Patient Instructions   Patient educated on importance of good blood sugar control.  Letter sent to primary care provider with diabetic eye exam report.     I recommend using artificial tears for your dry eye. There are over the counter drops that work well and may be used up to 4x daily (Systane , Refresh, Thera Tears)- avoid \"get the red out\" drops.     No glasses prescription necessary at this time.     Return in 1 year for a comprehensive eye exam, or sooner if needed.     The effects of the dilating drops last for 4- 6 hours.  You will be more sensitive to light and vision will be blurry up close.  Mydriatic sunglasses were given if needed.    Keyshawn Gutierrez, OD  [address]  [place]  " [address]    [phone]           Again, thank you for allowing me to participate in the care of your patient.        Sincerely,        Keyshawn Gutierrez OD

## 2021-12-08 NOTE — PATIENT INSTRUCTIONS
"Patient educated on importance of good blood sugar control.  Letter sent to primary care provider with diabetic eye exam report.     I recommend using artificial tears for your dry eye. There are over the counter drops that work well and may be used up to 4x daily (Systane , Refresh, Thera Tears)- avoid \"get the red out\" drops.     No glasses prescription necessary at this time.     Return in 1 year for a comprehensive eye exam, or sooner if needed.     The effects of the dilating drops last for 4- 6 hours.  You will be more sensitive to light and vision will be blurry up close.  Mydriatic sunglasses were given if needed.    Keyshawn Gutierrez, OD  [facility]  [address] [place] [address]. [place]  [address]    [phone]    "

## 2022-01-06 ENCOUNTER — OFFICE VISIT (OUTPATIENT)
Dept: ORTHOPEDICS | Facility: CLINIC | Age: 42
End: 2022-01-06
Payer: COMMERCIAL

## 2022-01-06 VITALS — WEIGHT: 162 LBS | BODY MASS INDEX: 26.03 KG/M2 | HEIGHT: 66 IN

## 2022-01-06 DIAGNOSIS — M25.561 CHRONIC PAIN OF RIGHT KNEE: Primary | ICD-10-CM

## 2022-01-06 DIAGNOSIS — G89.29 CHRONIC PAIN OF RIGHT KNEE: Primary | ICD-10-CM

## 2022-01-06 PROCEDURE — 99212 OFFICE O/P EST SF 10 MIN: CPT | Performed by: ORTHOPAEDIC SURGERY

## 2022-01-06 ASSESSMENT — MIFFLIN-ST. JEOR: SCORE: 1577.58

## 2022-01-06 NOTE — NURSING NOTE
"Reason For Visit:   Chief Complaint   Patient presents with     RECHECK     DOS: 6/25/21 right knee arthroscopy, ACL reconstruction hamstring autograft, lateral mensicectomy, medial meniscus repair     Date of surgery: 6/25/21  Type of surgery:   PROCEDURE:  1. Examination under anesthesia Right Knee  2. Right Knee arthroscopy  3. Right ACL reconstruction Hamstring autograft  4. Medial meniscus inside out repair  5. Partial lateral meniscectomy  6. Chondroplasty of the patella, trochlea and lateral femoral condyle    Overall doing well. He still has pain and discomfort when walking for long periods of time.    SANE Score  Left Knee: 98  Right Knee: 85    Pain Assessment  Patient Currently in Pain: Yes  0-10 Pain Scale: 4  Primary Pain Location: Knee    Ht 1.676 m (5' 6\")   Wt 73.5 kg (162 lb)   BMI 26.15 kg/m         No Known Allergies    Current Outpatient Medications   Medication     acetaminophen (TYLENOL) 325 MG tablet     alcohol swab prep pads     atorvastatin (LIPITOR) 20 MG tablet     blood glucose (NO BRAND SPECIFIED) test strip     blood glucose calibration (NO BRAND SPECIFIED) solution     blood glucose monitoring (NO BRAND SPECIFIED) meter device kit     blood glucose monitoring (NO BRAND SPECIFIED) meter device kit     Blood Glucose Monitoring Suppl (ACCU-CHEK GUIDE ME) w/Device KIT     ibuprofen (ADVIL/MOTRIN) 600 MG tablet     metFORMIN (GLUCOPHAGE) 500 MG tablet     omeprazole (PRILOSEC) 40 MG DR capsule     ondansetron (ZOFRAN-ODT) 4 MG ODT tab     thin (NO BRAND SPECIFIED) lancets     No current facility-administered medications for this visit.         Lillian Cordoba, ATC    "

## 2022-01-06 NOTE — LETTER
1/6/2022         RE: Ysabel Diggs  [address]  [place] [address]        Dear Colleague,    Thank you for referring your patient, Ysabel Diggs, to the [facility]. Please see a copy of my visit note below.    DIAGNOSIS:   1. Right chronic ACL tear  2. Medial lateral meniscus tears    PROCEDURES:  1. ACL reconstruction right knee hamstring autograft, medial meniscus repair, partial lateral meniscectomy, chondroplasty; date of surgery 6/25/2021    HISTORY:  Doing well 6 months following the above surgery.  No pain.  Feels improved from preoperative state.  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    EXAM:     General: Awake, Alert, and oriented. Articulates and communicates with a normal affect     Right lower Extremity:    Incisions well healed without evidence of infection    No post-operative effusion or ecchymosis    Range of motion shows him to be at full extension versus 5 degrees of hyperextension on the contralateral side     Flexion is symmetric     Lachman 0, no pivot shift     Neurovascularly intact    IMAGING:  No new imaging    ASSESSMENT:  1. 6 months following ACL reconstruction hamstring autograft, medial meniscus repair, partial lateral meniscectomy    PLAN:   Weightbearing: No weight bearing restriction  Range of Motion: No range of motion restrictions.   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  Commonsense in decision making encouraged.    Follow up: As needed         Again, thank you for allowing me to participate in the care of your patient.        Sincerely,        Devyn Beard MD

## 2022-01-06 NOTE — PROGRESS NOTES
DIAGNOSIS:   1. Right chronic ACL tear  2. Medial lateral meniscus tears    PROCEDURES:  1. ACL reconstruction right knee hamstring autograft, medial meniscus repair, partial lateral meniscectomy, chondroplasty; date of surgery 6/25/2021    HISTORY:  Doing well 6 months following the above surgery.  No pain.  Feels improved from preoperative state.  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    EXAM:     General: Awake, Alert, and oriented. Articulates and communicates with a normal affect     Right lower Extremity:    Incisions well healed without evidence of infection    No post-operative effusion or ecchymosis    Range of motion shows him to be at full extension versus 5 degrees of hyperextension on the contralateral side     Flexion is symmetric     Lachman 0, no pivot shift     Neurovascularly intact    IMAGING:  No new imaging    ASSESSMENT:  1. 6 months following ACL reconstruction hamstring autograft, medial meniscus repair, partial lateral meniscectomy    PLAN:   Weightbearing: No weight bearing restriction  Range of Motion: No range of motion restrictions.   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  Commonsense in decision making encouraged.    Follow up: As needed

## 2022-02-07 PROBLEM — M17.31 POST-TRAUMATIC OSTEOARTHRITIS OF RIGHT KNEE: Status: RESOLVED | Noted: 2021-05-27 | Resolved: 2022-02-07

## 2022-02-07 PROBLEM — M23.611: Status: RESOLVED | Noted: 2021-06-30 | Resolved: 2022-02-07

## 2022-02-07 PROBLEM — S83.511A: Status: RESOLVED | Noted: 2021-06-30 | Resolved: 2022-02-07

## 2022-02-07 NOTE — PROGRESS NOTES
Subjective:  HPI  Physical Exam                    Objective:  System    Physical Exam    General     ROS    Assessment/Plan:    DISCHARGE REPORT    Progress reporting period is from 06/30/2021 to 02/07/2022.   Patient has not returned to therapy so current objective findings are unknown.  The subjective and objective information are from the last visit (12/06/2021) with this patient.    SUBJECTIVE  Subjective: R knee continues to improve.  Still has medial knee pain w/ lying down at night and in AM, doesn't feel the medial knee during the day.  Continues to walk 2 1/2 miles at normal pace.     Current Pain level: 5/10 (with lying down to sleep and in AM)   Initial Pain level: 6/10   Changes in function: Yes, see goal flow sheet for change in function   Adverse reactions: None;   ,       OBJECTIVE  Objective: See Daily Note in Notewriter for pt status.      ASSESSMENT/PLAN  Updated problem list and treatment plan: Diagnosis 1:  Knee pain -- home program  STG/LTGs have been met or progress has been made towards goals:  N/A  Assessment of Progress: The patient has not returned to therapy. Current status is unknown.  Self Management Plans:  Patient has been instructed in a home treatment program.  Miraf continues to require the following intervention to meet STG and LTG's: PT intervention is no longer required to meet STG/LTG.    Recommendations:  Pt last seen in PT 12/06/2021.  See cancellation since then.  Discharge to [facility].

## 2022-03-12 ENCOUNTER — HEALTH MAINTENANCE LETTER (OUTPATIENT)
Age: 42
End: 2022-03-12

## 2022-03-24 DIAGNOSIS — E11.65 UNCONTROLLED TYPE 2 DIABETES MELLITUS WITH HYPERGLYCEMIA (H): ICD-10-CM

## 2022-03-28 NOTE — TELEPHONE ENCOUNTER
Prescription approved per [facility].    Zuri Hernandez RN

## 2022-06-25 ENCOUNTER — MYC REFILL (OUTPATIENT)
Dept: FAMILY MEDICINE | Facility: CLINIC | Age: 42
End: 2022-06-25

## 2022-06-25 DIAGNOSIS — E11.65 UNCONTROLLED TYPE 2 DIABETES MELLITUS WITH HYPERGLYCEMIA (H): ICD-10-CM

## 2022-06-27 NOTE — TELEPHONE ENCOUNTER
Medication is being filled for 1 time refill only due to:  Patient needs to be seen because overdue for diabetic check.   [employer] message sent.     Zuri Hernandez RN

## 2022-06-29 ENCOUNTER — LAB (OUTPATIENT)
Dept: LAB | Facility: CLINIC | Age: 42
End: 2022-06-29
Payer: COMMERCIAL

## 2022-06-29 DIAGNOSIS — E11.9 DIABETES MELLITUS, TYPE 2 (H): Primary | ICD-10-CM

## 2022-06-29 LAB — HBA1C MFR BLD: 6 % (ref 0–5.6)

## 2022-06-29 PROCEDURE — 36415 COLL VENOUS BLD VENIPUNCTURE: CPT

## 2022-06-29 PROCEDURE — 83036 HEMOGLOBIN GLYCOSYLATED A1C: CPT

## 2022-08-29 ENCOUNTER — MYC REFILL (OUTPATIENT)
Dept: FAMILY MEDICINE | Facility: CLINIC | Age: 42
End: 2022-08-29

## 2022-08-29 DIAGNOSIS — E11.65 UNCONTROLLED TYPE 2 DIABETES MELLITUS WITH HYPERGLYCEMIA (H): ICD-10-CM

## 2022-08-30 NOTE — TELEPHONE ENCOUNTER
Routing refill request to provider for review/approval because:  Patient is due for follow-up appointment.           Zi Quezada RN on 8/30/2022 at 1:53 PM

## 2022-09-01 ENCOUNTER — MYC REFILL (OUTPATIENT)
Dept: FAMILY MEDICINE | Facility: CLINIC | Age: 42
End: 2022-09-01

## 2022-09-01 DIAGNOSIS — E11.65 UNCONTROLLED TYPE 2 DIABETES MELLITUS WITH HYPERGLYCEMIA (H): ICD-10-CM

## 2022-09-02 NOTE — TELEPHONE ENCOUNTER
[employer] message sent to patient.     Thanks,  SURINDER Su  [facility]

## 2022-09-05 NOTE — TELEPHONE ENCOUNTER
Routing refill request to provider for review/approval because:  Daja given x1 and patient did not follow up, please advise

## 2023-02-21 DIAGNOSIS — E11.65 UNCONTROLLED TYPE 2 DIABETES MELLITUS WITH HYPERGLYCEMIA (H): ICD-10-CM

## 2023-02-28 NOTE — TELEPHONE ENCOUNTER
Patient read [employer] message on 02/24/2023.        2:12 PM  Ysabel,     We received a refill request from your pharmacy in which your medication alcohol swab prep pads, blood glucose (NO BRAND SPECIFIED) test strip and thin (NO BRAND SPECIFIED) lancets were not filled as you need a office visit as you were last seen 2021.Please call the clinic at [phone] to schedule your appointment.     Jaylyn Reddy   [facility]    Last read by Ysabel Diggs at 12:29 PM on 2/24/2023.

## 2023-03-02 RX ORDER — LANCETS
EACH MISCELLANEOUS
Qty: 100 EACH | Refills: 6 | Status: SHIPPED | OUTPATIENT
Start: 2023-03-02

## 2023-03-02 RX ORDER — GLUCOSAMINE HCL/CHONDROITIN SU 500-400 MG
CAPSULE ORAL
Qty: 100 EACH | Refills: 3 | Status: SHIPPED | OUTPATIENT
Start: 2023-03-02

## 2023-04-23 ENCOUNTER — HEALTH MAINTENANCE LETTER (OUTPATIENT)
Age: 43
End: 2023-04-23

## 2023-09-23 ENCOUNTER — HEALTH MAINTENANCE LETTER (OUTPATIENT)
Age: 43
End: 2023-09-23

## 2024-02-10 ENCOUNTER — HEALTH MAINTENANCE LETTER (OUTPATIENT)
Age: 44
End: 2024-02-10

## 2024-06-29 ENCOUNTER — HEALTH MAINTENANCE LETTER (OUTPATIENT)
Age: 44
End: 2024-06-29

## 2024-11-16 ENCOUNTER — HEALTH MAINTENANCE LETTER (OUTPATIENT)
Age: 44
End: 2024-11-16

## 2025-03-08 ENCOUNTER — HEALTH MAINTENANCE LETTER (OUTPATIENT)
Age: 45
End: 2025-03-08

## 2025-06-22 ENCOUNTER — HEALTH MAINTENANCE LETTER (OUTPATIENT)
Age: 45
End: 2025-06-22

## 2025-07-13 ENCOUNTER — HEALTH MAINTENANCE LETTER (OUTPATIENT)
Age: 45
End: 2025-07-13

## (undated) DEVICE — LINEN GOWN XLG 5407

## (undated) DEVICE — PREP DURAPREP 26ML APL 8630

## (undated) DEVICE — TUBING SYSTEM ARTHREX PATIENT REDEUCE AR-6421

## (undated) DEVICE — ESU GROUND PAD ADULT W/CORD E7507

## (undated) DEVICE — PREP DURAPREP REMOVER 4OZ 8611

## (undated) DEVICE — SUCTION MANIFOLD NEPTUNE 2 SYS 4 PORT 0702-020-000

## (undated) DEVICE — SU VICRYL 2-0 CT-1 27" UND J259H

## (undated) DEVICE — ABLATOR ARTHREX APOLLO RF MP90 ASPIRATING 90DEG AR-9811

## (undated) DEVICE — LINEN ORTHO PACK 5446

## (undated) DEVICE — LINEN TOWEL PACK X5 5464

## (undated) DEVICE — GLOVE PROTEXIS POWDER FREE SMT 8.0  2D72PT80X

## (undated) DEVICE — DRSG STERI STRIP 1/2X4" R1547

## (undated) DEVICE — SU ETHIBOND 1 CT-1 30" X425H

## (undated) DEVICE — ESU PENCIL SMOKE EVAC W/ROCKER SWITCH 0703-047-000

## (undated) DEVICE — TUBING SUCTION MEDI-VAC 1/4"X20' N620A

## (undated) DEVICE — SU MONOCRYL 3-0 PS-1 27" Y936H

## (undated) DEVICE — SOL NACL 0.9% IRRIG 3000ML BAG 2B7477

## (undated) DEVICE — BUR ARTHREX COOLCUT SABRE 4.0MMX13CM AR-8400SR

## (undated) DEVICE — GLOVE PROTEXIS BLUE W/NEU-THERA 8.0  2D73EB80

## (undated) DEVICE — PACK ARTHROSCOPY CUSTOM ASC

## (undated) DEVICE — PACK ACL SUPPLEMENT CUSTOM ASC

## (undated) DEVICE — DRAPE TIBURON TOP SHEET 100X60" 29352

## (undated) DEVICE — PEN MARKING SKIN W/PAPER RULER 31145785

## (undated) DEVICE — PAD ARMBOARD FOAM EGGCRATE COVIDEN 3114367

## (undated) DEVICE — Device

## (undated) RX ORDER — CEFAZOLIN SODIUM 2 G/50ML
SOLUTION INTRAVENOUS
Status: DISPENSED
Start: 2021-06-25

## (undated) RX ORDER — ONDANSETRON 2 MG/ML
INJECTION INTRAMUSCULAR; INTRAVENOUS
Status: DISPENSED
Start: 2021-06-25

## (undated) RX ORDER — OXYCODONE HYDROCHLORIDE 5 MG/1
TABLET ORAL
Status: DISPENSED
Start: 2021-06-25

## (undated) RX ORDER — FENTANYL CITRATE 50 UG/ML
INJECTION, SOLUTION INTRAMUSCULAR; INTRAVENOUS
Status: DISPENSED
Start: 2021-06-25

## (undated) RX ORDER — PROPOFOL 10 MG/ML
INJECTION, EMULSION INTRAVENOUS
Status: DISPENSED
Start: 2021-06-25

## (undated) RX ORDER — DEXAMETHASONE SODIUM PHOSPHATE 4 MG/ML
INJECTION, SOLUTION INTRA-ARTICULAR; INTRALESIONAL; INTRAMUSCULAR; INTRAVENOUS; SOFT TISSUE
Status: DISPENSED
Start: 2021-06-25

## (undated) RX ORDER — GABAPENTIN 300 MG/1
CAPSULE ORAL
Status: DISPENSED
Start: 2021-06-25

## (undated) RX ORDER — LIDOCAINE HYDROCHLORIDE 20 MG/ML
INJECTION, SOLUTION EPIDURAL; INFILTRATION; INTRACAUDAL; PERINEURAL
Status: DISPENSED
Start: 2021-06-25

## (undated) RX ORDER — HYDROMORPHONE HYDROCHLORIDE 1 MG/ML
INJECTION, SOLUTION INTRAMUSCULAR; INTRAVENOUS; SUBCUTANEOUS
Status: DISPENSED
Start: 2021-06-25

## (undated) RX ORDER — ACETAMINOPHEN 325 MG/1
TABLET ORAL
Status: DISPENSED
Start: 2021-06-25